# Patient Record
Sex: FEMALE | Race: WHITE | NOT HISPANIC OR LATINO | Employment: FULL TIME | URBAN - METROPOLITAN AREA
[De-identification: names, ages, dates, MRNs, and addresses within clinical notes are randomized per-mention and may not be internally consistent; named-entity substitution may affect disease eponyms.]

---

## 2017-01-25 ENCOUNTER — PATIENT OUTREACH (OUTPATIENT)
Dept: ADMINISTRATIVE | Facility: HOSPITAL | Age: 37
End: 2017-01-25

## 2017-02-08 ENCOUNTER — OFFICE VISIT (OUTPATIENT)
Dept: INTERNAL MEDICINE | Facility: CLINIC | Age: 37
End: 2017-02-08
Payer: COMMERCIAL

## 2017-02-08 VITALS
WEIGHT: 147.06 LBS | HEIGHT: 64 IN | BODY MASS INDEX: 25.11 KG/M2 | DIASTOLIC BLOOD PRESSURE: 82 MMHG | SYSTOLIC BLOOD PRESSURE: 116 MMHG | OXYGEN SATURATION: 97 % | HEART RATE: 87 BPM

## 2017-02-08 DIAGNOSIS — Z00.00 ANNUAL PHYSICAL EXAM: Primary | ICD-10-CM

## 2017-02-08 DIAGNOSIS — J30.1 SEASONAL ALLERGIC RHINITIS DUE TO POLLEN: ICD-10-CM

## 2017-02-08 PROCEDURE — 90472 IMMUNIZATION ADMIN EACH ADD: CPT | Mod: S$GLB,,, | Performed by: INTERNAL MEDICINE

## 2017-02-08 PROCEDURE — 90715 TDAP VACCINE 7 YRS/> IM: CPT | Mod: S$GLB,,, | Performed by: INTERNAL MEDICINE

## 2017-02-08 PROCEDURE — 90471 IMMUNIZATION ADMIN: CPT | Mod: S$GLB,,, | Performed by: INTERNAL MEDICINE

## 2017-02-08 PROCEDURE — 99385 PREV VISIT NEW AGE 18-39: CPT | Mod: 25,S$GLB,, | Performed by: INTERNAL MEDICINE

## 2017-02-08 PROCEDURE — 90686 IIV4 VACC NO PRSV 0.5 ML IM: CPT | Mod: S$GLB,,, | Performed by: INTERNAL MEDICINE

## 2017-02-08 PROCEDURE — 99999 PR PBB SHADOW E&M-EST. PATIENT-LVL III: CPT | Mod: PBBFAC,,, | Performed by: INTERNAL MEDICINE

## 2017-02-08 NOTE — MR AVS SNAPSHOT
"    [facility]  [address]  [place] [address]  Phone: [phone]  Fax: [phone]                  Akua Mejia   2017 1:40 PM   Office Visit    Description:  Female : 1980   Provider:  Donn Lamb MD   Department:  [facility]           Reason for Visit     Annual Exam           Diagnoses this Visit        Comments    Annual physical exam    -  Primary     Seasonal allergic rhinitis due to pollen                To Do List           Future Appointments        Provider Department Dept Phone    2017 7:30 AM LAB, [facility] [phone]      Goals (5 Years of Data)     None      Follow-Up and Disposition     Return if symptoms worsen or fail to improve.      [facility]     [facility] Care Line -  Assistance  Registered nurses in the [facility] provide clinical advisement, health education, appointment booking, and other advisory services.  Call for this free service at [phone].             Medications           Message regarding Medications     Verify the changes and/or additions to your medication regime listed below are the same as discussed with your clinician today.  If any of these changes or additions are incorrect, please notify your healthcare provider.        STOP taking these medications     montelukast (SINGULAIR) 10 mg tablet            Verify that the below list of medications is an accurate representation of the medications you are currently taking.  If none reported, the list may be blank. If incorrect, please contact your healthcare provider. Carry this list with you in case of emergency.           Current Medications     norethindrone-e.estradiol-iron (MINASTRIN 24 FE) 1 mg-20 mcg(24) /75 mg (4) Chew Take 1 tablet by mouth once daily.           Clinical Reference Information           Your Vitals Were     BP Pulse Height Weight SpO2 BMI    116/82 87 5' 4" (1.626 m) " 66.7 kg (147 lb 0.8 oz) 97% 25.24 kg/m2      Blood Pressure          Most Recent Value    BP  116/82      Allergies as of 2/8/2017     No Known Allergies      Immunizations Administered on Date of Encounter - 2/8/2017     Name Date Dose VIS Date Route    TDAP 2/8/2017 0.5 mL 2/24/2015 Intramuscular    influenza - Quadrivalent - PF (ADULT) 2/8/2017 0.5 mL 8/7/2015 Intramuscular      Orders Placed During Today's Visit      Normal Orders This Visit    Influenza - Quadrivalent (3 years & older) (PF)     Tdap Vaccine (Adult)     Future Labs/Procedures Expected by Expires    Comprehensive metabolic panel  2/8/2017 4/9/2018    Lipid panel  2/8/2017 3/26/2018    TSH  2/8/2017 4/9/2018      Language Assistance Services     ATTENTION: Language assistance services are available, free of charge. Please call [phone].      ATENCIÓN: Si habla español, tiene a white disposición servicios gratuitos de asistencia lingüística. Llame al [phone].     CHÚ Ý: N?u b?n nói Ti?ng Vi?t, có các d?ch v? h? tr? ngôn ng? mi?n phí dành cho b?n. G?i s? [phone].         [facility] complies with applicable Federal civil rights laws and does not discriminate on the basis of race, color, national origin, age, disability, or sex.

## 2017-02-08 NOTE — PROGRESS NOTES
Patient given Adacel 0.5ml IM in the LD. Patient tolerated well and Band-Aid was applied. Lot#[id number] Exp:12/10/18. Patient advised to wait in the lobby for 15 min to make sure no adverse reactions occur. Patient states verbal understanding and has no further questions.    Patient given Fluzone PF 0.5ml in the RD. Patient tolerated well and Band-Aid was applied. Lot#[id number] Exp:06/30/2017. Patient advised to wait in the lobby for 15 min to make sure no adverse reactions occur. Patient states verbal understanding and has no further questions.

## 2017-02-08 NOTE — PROGRESS NOTES
"Subjective:       Patient ID: Akua Mejia is a 36 y.o. female.    Chief Complaint: Annual Exam    HPI Comments:   New pt.      Here for physical exam.    She is feeling well today.  No c/o.     ROS  -Some pain in several MCP joints of B hands as well as B ankles, L>R, for the past 1-2 weeks.  No injury.  She isn' taking anything for these sx.  Sx are mild and are worse in the evenings.     Not exercising lately.         Review of Systems   Constitutional: Negative.    HENT: Negative.    Eyes: Negative.    Respiratory: Negative.    Cardiovascular: Negative.    Gastrointestinal: Negative.    Genitourinary: Negative.    Musculoskeletal: Negative.    Skin: Negative.    Neurological: Negative.    Psychiatric/Behavioral: Negative.        History reviewed. No pertinent past medical history.    History reviewed. No pertinent past surgical history.    Family History   Problem Relation Age of Onset    Colon cancer Paternal Grandfather     Thyroid disease Mother     Heart disease Father     Hypertension Father     Thyroid nodules Maternal Aunt     Thyroid disease Maternal Grandmother     Thyroid disease Paternal Grandmother     Breast cancer Neg Hx     Eclampsia Neg Hx      labor Neg Hx        Social History     Social History    Marital status: Single     Spouse name: N/A    Number of children: N/A    Years of education: N/A     Occupational History          Social History Main Topics    Smoking status: Never Smoker    Smokeless tobacco: None    Alcohol use No    Drug use: No    Sexual activity: Yes     Partners: Male     Birth control/ protection: OCP     Other Topics Concern    None     Social History Narrative    Lives w/partner.  Engaged to be  in .  No children.  Originally from [place].     Objective:       Vitals:    17 1317   BP: 116/82   Pulse: 87   SpO2: 97%   Weight: 66.7 kg (147 lb 0.8 oz)   Height: 5' 4" (1.626 m)     Physical Exam   Constitutional: She " is oriented to person, place, and time. She appears well-developed and well-nourished.   HENT:   Head: Normocephalic and atraumatic.   Right Ear: Tympanic membrane, external ear and ear canal normal.   Left Ear: Tympanic membrane, external ear and ear canal normal.   Mouth/Throat: Oropharynx is clear and moist. No oropharyngeal exudate or posterior oropharyngeal erythema.   Eyes: Conjunctivae and EOM are normal. Pupils are equal, round, and reactive to light.   Neck: Normal range of motion. Neck supple. No thyromegaly present.   Cardiovascular: Normal rate, regular rhythm and normal heart sounds.  Exam reveals no gallop and no friction rub.    No murmur heard.  Pulmonary/Chest: Effort normal and breath sounds normal. She has no wheezes. She has no rales.   Abdominal: Soft. Bowel sounds are normal. She exhibits no distension. There is no tenderness. There is no rebound and no guarding.   Musculoskeletal: Normal range of motion. She exhibits no edema.        Right wrist: Normal.        Left wrist: Normal.        Right hand: Normal.        Left hand: Normal.   Lymphadenopathy:     She has no cervical adenopathy.   Neurological: She is alert and oriented to person, place, and time. She has normal strength. She displays no atrophy and no tremor. No sensory deficit. She exhibits normal muscle tone. Gait normal.   Skin: Skin is warm and dry. No rash noted.   Psychiatric: She has a normal mood and affect. Her behavior is normal. Thought content normal.   Vitals reviewed.      Assessment:       1. Annual physical exam    2. Seasonal allergic rhinitis due to pollen        Plan:           HM - Update labs.  Tdap and flu vaccines.     AR - OTC meds PRN.

## 2017-02-13 ENCOUNTER — LAB VISIT (OUTPATIENT)
Dept: LAB | Facility: OTHER | Age: 37
End: 2017-02-13
Attending: INTERNAL MEDICINE
Payer: COMMERCIAL

## 2017-02-13 DIAGNOSIS — Z00.00 ANNUAL PHYSICAL EXAM: ICD-10-CM

## 2017-02-13 LAB
ALBUMIN SERPL BCP-MCNC: 3.5 G/DL
ALP SERPL-CCNC: 52 U/L
ALT SERPL W/O P-5'-P-CCNC: 13 U/L
ANION GAP SERPL CALC-SCNC: 8 MMOL/L
AST SERPL-CCNC: 18 U/L
BILIRUB SERPL-MCNC: 0.2 MG/DL
BUN SERPL-MCNC: 9 MG/DL
CALCIUM SERPL-MCNC: 9.3 MG/DL
CHLORIDE SERPL-SCNC: 106 MMOL/L
CHOLEST/HDLC SERPL: 3.3 {RATIO}
CO2 SERPL-SCNC: 24 MMOL/L
CREAT SERPL-MCNC: 0.8 MG/DL
EST. GFR  (AFRICAN AMERICAN): >60 ML/MIN/1.73 M^2
EST. GFR  (NON AFRICAN AMERICAN): >60 ML/MIN/1.73 M^2
GLUCOSE SERPL-MCNC: 90 MG/DL
HDL/CHOLESTEROL RATIO: 30 %
HDLC SERPL-MCNC: 180 MG/DL
HDLC SERPL-MCNC: 54 MG/DL
LDLC SERPL CALC-MCNC: 103.2 MG/DL
NONHDLC SERPL-MCNC: 126 MG/DL
POTASSIUM SERPL-SCNC: 4 MMOL/L
PROT SERPL-MCNC: 6.9 G/DL
SODIUM SERPL-SCNC: 138 MMOL/L
TRIGL SERPL-MCNC: 114 MG/DL
TSH SERPL DL<=0.005 MIU/L-ACNC: 2.25 UIU/ML

## 2017-02-13 PROCEDURE — 80053 COMPREHEN METABOLIC PANEL: CPT

## 2017-02-13 PROCEDURE — 36415 COLL VENOUS BLD VENIPUNCTURE: CPT

## 2017-02-13 PROCEDURE — 84443 ASSAY THYROID STIM HORMONE: CPT

## 2017-02-13 PROCEDURE — 80061 LIPID PANEL: CPT

## 2017-02-14 ENCOUNTER — PATIENT MESSAGE (OUTPATIENT)
Dept: INTERNAL MEDICINE | Facility: CLINIC | Age: 37
End: 2017-02-14

## 2017-03-21 ENCOUNTER — PATIENT MESSAGE (OUTPATIENT)
Dept: OBSTETRICS AND GYNECOLOGY | Facility: CLINIC | Age: 37
End: 2017-03-21

## 2017-03-21 DIAGNOSIS — Z30.41 ENCOUNTER FOR SURVEILLANCE OF CONTRACEPTIVE PILLS: ICD-10-CM

## 2017-03-21 RX ORDER — NORETHINDRONE ACETATE AND ETHINYL ESTRADIOL AND FERROUS FUMARATE 1MG-20(24)
1 KIT ORAL DAILY
Qty: 90 TABLET | Refills: 4 | Status: SHIPPED | OUTPATIENT
Start: 2017-03-21 | End: 2017-08-04 | Stop reason: SDUPTHER

## 2017-08-04 ENCOUNTER — PATIENT MESSAGE (OUTPATIENT)
Dept: INTERNAL MEDICINE | Facility: CLINIC | Age: 37
End: 2017-08-04

## 2017-08-04 ENCOUNTER — OFFICE VISIT (OUTPATIENT)
Dept: INTERNAL MEDICINE | Facility: CLINIC | Age: 37
End: 2017-08-04
Payer: COMMERCIAL

## 2017-08-04 ENCOUNTER — TELEPHONE (OUTPATIENT)
Dept: INTERNAL MEDICINE | Facility: CLINIC | Age: 37
End: 2017-08-04

## 2017-08-04 ENCOUNTER — PATIENT MESSAGE (OUTPATIENT)
Dept: OBSTETRICS AND GYNECOLOGY | Facility: CLINIC | Age: 37
End: 2017-08-04

## 2017-08-04 ENCOUNTER — TELEPHONE (OUTPATIENT)
Dept: OBSTETRICS AND GYNECOLOGY | Facility: CLINIC | Age: 37
End: 2017-08-04

## 2017-08-04 VITALS
DIASTOLIC BLOOD PRESSURE: 72 MMHG | TEMPERATURE: 99 F | OXYGEN SATURATION: 97 % | HEIGHT: 63 IN | WEIGHT: 153.88 LBS | SYSTOLIC BLOOD PRESSURE: 102 MMHG | BODY MASS INDEX: 27.27 KG/M2 | HEART RATE: 85 BPM

## 2017-08-04 DIAGNOSIS — J06.9 ACUTE URI: Primary | ICD-10-CM

## 2017-08-04 DIAGNOSIS — Z30.41 ENCOUNTER FOR SURVEILLANCE OF CONTRACEPTIVE PILLS: ICD-10-CM

## 2017-08-04 PROCEDURE — 99213 OFFICE O/P EST LOW 20 MIN: CPT | Mod: S$GLB,,, | Performed by: INTERNAL MEDICINE

## 2017-08-04 PROCEDURE — 99999 PR PBB SHADOW E&M-EST. PATIENT-LVL III: CPT | Mod: PBBFAC,,, | Performed by: INTERNAL MEDICINE

## 2017-08-04 PROCEDURE — 3008F BODY MASS INDEX DOCD: CPT | Mod: S$GLB,,, | Performed by: INTERNAL MEDICINE

## 2017-08-04 RX ORDER — AZITHROMYCIN 250 MG/1
TABLET, FILM COATED ORAL
COMMUNITY
Start: 2017-07-31 | End: 2017-09-06

## 2017-08-04 RX ORDER — NORETHINDRONE ACETATE AND ETHINYL ESTRADIOL AND FERROUS FUMARATE 1MG-20(24)
1 KIT ORAL DAILY
Qty: 90 TABLET | Refills: 1 | Status: SHIPPED | OUTPATIENT
Start: 2017-08-04 | End: 2017-09-06 | Stop reason: SDUPTHER

## 2017-08-04 RX ORDER — ALBUTEROL SULFATE 90 UG/1
2 AEROSOL, METERED RESPIRATORY (INHALATION) EVERY 6 HOURS PRN
Qty: 18 G | Refills: 0 | Status: SHIPPED | OUTPATIENT
Start: 2017-08-04 | End: 2018-08-22

## 2017-08-04 NOTE — TELEPHONE ENCOUNTER
----- Message from Shelley Taylor sent at 8/4/2017  8:20 AM CDT -----  Contact: SWAPNA ODEN [[id number]]  x_  1st Request  _  2nd Request  _  3rd Request        Who: SWAPNA ODEN [[id number]]    Why: patient would like a call back regarding medication. Please call     What Number to Call Back:[phone]    When to Expect a call back: (With in 24 hours)

## 2017-08-04 NOTE — TELEPHONE ENCOUNTER
----- Message from Danette Mejias LPN sent at 8/4/2017  9:12 AM CDT -----  Dr York,     Is it possible to get a renewal on my minastrin prescription until my appointment set in September?       Thank you,     Akua Mejia

## 2017-08-04 NOTE — TELEPHONE ENCOUNTER
Called the patient to verify and also forwarded her previous my chart message to Dr Lamb in regards to the rx that she changed her mind about.

## 2017-08-04 NOTE — PROGRESS NOTES
"Subjective:       Patient ID: Akua Mejia is a 37 y.o. female.    Chief Complaint: Shortness of Breath      Pt c/o URI sx which started 2 weeks ago.  1 week ago she went to  and was given a steroid shot and a Zpack.  She also was given promethazine but hasn't been taking this b/c it leaves her groggy.  Her sx have been improving this week but haven't fully resolved.     She isn't taking any OTCs except Tylenol.           Shortness of Breath   This is a new problem. The current episode started 1 to 4 weeks ago. The problem occurs daily. The problem has been gradually worsening. The average episode lasts 5 days. Associated symptoms include chest pain, coryza, ear pain, headaches, neck pain, orthopnea, rhinorrhea and a sore throat. The symptoms are aggravated by emotional upset, occupational exposure, exercise, odors, URIs and lying flat. Risk factors include oral contraceptive. She has tried body position changes and cool air for the symptoms. Her past medical history is significant for allergies. There is no history of aspirin allergies, asthma, CAD, PE or a recent surgery.     Review of Systems   HENT: Positive for ear pain, rhinorrhea and sore throat.    Respiratory: Positive for shortness of breath.    Cardiovascular: Positive for chest pain and orthopnea.   Musculoskeletal: Positive for neck pain.   Neurological: Positive for headaches.       Objective:       Vitals:    08/04/17 0748   BP: 102/72   Pulse: 85   Temp: 98.6 °F (37 °C)   SpO2: 97%   Weight: 69.8 kg (153 lb 14.1 oz)   Height: 5' 3" (1.6 m)     Physical Exam   Constitutional: She is oriented to person, place, and time. She appears well-developed and well-nourished. No distress.   HENT:   Head: Normocephalic and atraumatic.   Right Ear: Tympanic membrane, external ear and ear canal normal.   Left Ear: Tympanic membrane, external ear and ear canal normal.   Mouth/Throat: Uvula is midline, oropharynx is clear and moist and mucous membranes are normal. " No oropharyngeal exudate or posterior oropharyngeal erythema.   Eyes: Conjunctivae and EOM are normal. Pupils are equal, round, and reactive to light.   Neck: Normal range of motion. Neck supple.   Cardiovascular: Normal rate, regular rhythm and normal heart sounds.  Exam reveals no gallop and no friction rub.    No murmur heard.  Pulmonary/Chest: Effort normal and breath sounds normal. No respiratory distress. She has no wheezes. She has no rhonchi. She has no rales.   Lymphadenopathy:     She has no cervical adenopathy.   Neurological: She is alert and oriented to person, place, and time.   Skin: She is not diaphoretic.       Assessment:       1. Acute URI        Plan:           URI - Resolving.  Cont current tx.  Pt reassured.

## 2017-09-06 ENCOUNTER — OFFICE VISIT (OUTPATIENT)
Dept: OBSTETRICS AND GYNECOLOGY | Facility: CLINIC | Age: 37
End: 2017-09-06
Payer: COMMERCIAL

## 2017-09-06 VITALS
SYSTOLIC BLOOD PRESSURE: 98 MMHG | DIASTOLIC BLOOD PRESSURE: 70 MMHG | BODY MASS INDEX: 26.91 KG/M2 | WEIGHT: 151.88 LBS | HEIGHT: 63 IN

## 2017-09-06 DIAGNOSIS — Z30.41 ENCOUNTER FOR SURVEILLANCE OF CONTRACEPTIVE PILLS: ICD-10-CM

## 2017-09-06 DIAGNOSIS — Z01.419 WELL WOMAN EXAM WITH ROUTINE GYNECOLOGICAL EXAM: Primary | ICD-10-CM

## 2017-09-06 PROCEDURE — 99395 PREV VISIT EST AGE 18-39: CPT | Mod: S$GLB,,, | Performed by: OBSTETRICS & GYNECOLOGY

## 2017-09-06 PROCEDURE — 99999 PR PBB SHADOW E&M-EST. PATIENT-LVL III: CPT | Mod: PBBFAC,,, | Performed by: OBSTETRICS & GYNECOLOGY

## 2017-09-06 RX ORDER — NORETHINDRONE ACETATE AND ETHINYL ESTRADIOL AND FERROUS FUMARATE 1MG-20(24)
1 KIT ORAL DAILY
Qty: 90 TABLET | Refills: 5 | Status: SHIPPED | OUTPATIENT
Start: 2017-09-06 | End: 2017-11-28

## 2017-09-06 NOTE — PROGRESS NOTES
SUBJECTIVE:   37 y.o. female   for annual routine Pap and checkup. Patient's last menstrual period was 2017..  She has no unusual complaints.  Pt is concerned about weight gain over the past year.  Has gained 9# in 1 year.  Discussed eating habits and exercise regimen.  Discussed exercising for at least 30 min a day and decreasing stressors in life.  Normal menses.  On OCPs.  Doing well.         History reviewed. No pertinent past medical history.  History reviewed. No pertinent surgical history.  Social History     Social History    Marital status: Single     Spouse name: N/A    Number of children: N/A    Years of education: N/A     Occupational History          Social History Main Topics    Smoking status: Never Smoker    Smokeless tobacco: Never Used    Alcohol use No    Drug use: No    Sexual activity: Yes     Partners: Male     Birth control/ protection: OCP     Other Topics Concern    Not on file     Social History Narrative    Lives w/partner.  Engaged to be  in .  No children.  Originally from [place].     Family History   Problem Relation Age of Onset    Colon cancer Paternal Grandfather     Thyroid disease Mother     Heart disease Father     Hypertension Father     Thyroid nodules Maternal Aunt     Thyroid disease Maternal Grandmother     Thyroid disease Paternal Grandmother     Breast cancer Neg Hx     Eclampsia Neg Hx      labor Neg Hx      OB History    Para Term  AB Living   0 0 0 0 0 0   SAB TAB Ectopic Multiple Live Births   0 0 0 0                 Current Outpatient Prescriptions   Medication Sig Dispense Refill    MINASTRIN 24 FE 1 mg-20 mcg(24) /75 mg (4) Chew Take 1 tablet by mouth once daily. 90 tablet 5    albuterol 90 mcg/actuation inhaler Inhale 2 puffs into the lungs every 6 (six) hours as needed for Wheezing or Shortness of Breath. Rescue 18 g 0     No current facility-administered medications for this visit.   "    Allergies: Review of patient's allergies indicates no known allergies.     ROS:  Constitutional: no weight loss, weight gain, fever, fatigue  Eyes:  No vision changes, glasses/contacts  ENT/Mouth: No ulcers, sinus problems, ears ringing, headache  Cardiovascular: No inability to lie flat, chest pain, exercise intolerance, swelling, heart palpitations  Respiratory: No wheezing, coughing blood, shortness of breath, or cough  Gastrointestinal: No diarrhea, bloody stool, nausea/vomiting, constipation, gas, hemorrhoids  Genitourinary: No blood in urine, painful urination, urgency of urination, frequency of urination, incomplete emptying, incontinence, abnormal bleeding, painful periods, heavy periods, vaginal discharge, vaginal odor, painful intercourse, sexual problems, bleeding after intercourse.  Musculoskeletal: No muscle weakness  Skin/Breast: No painful breasts, nipple discharge, masses, rash, ulcers  Neurological: No passing out, seizures, numbness, headache  Endocrine: No diabetes, hypothyroid, hyperthyroid, hot flashes, hair loss, abnormal hair growth, ance  Psychiatric: No depression, crying  Hematologic: No bruises, bleeding, swollen lymph nodes, anemia.      OBJECTIVE:   The patient appears well, alert, oriented x 3, in no distress.  BP 98/70   Ht 5' 3" (1.6 m)   Wt 68.9 kg (151 lb 14.4 oz)   LMP 08/26/2017   BMI 26.91 kg/m²   NECK: no thyromegaly, trachea midline  SKIN: no acne, striae, hirsutism  BREAST EXAM: breasts appear normal, no suspicious masses, no skin or nipple changes or axillary nodes  ABDOMEN: no hernias, masses, or hepatosplenomegaly  GENITALIA: normal external genitalia, no erythema, no discharge  URETHRA: normal urethra, normal urethral meatus  VAGINA: Normal  CERVIX: no lesions or cervical motion tenderness  UTERUS: normal size, contour, position, consistency, mobility, non-tender  ADNEXA: normal adnexa      ASSESSMENT:   Ollie was seen today for annual exam.    Diagnoses and all " orders for this visit:    Well woman exam with routine gynecological exam  - pap in 2016 normal.  No pap indicated at this time  - All other health maintenance up to date  - Discussed weight loss and weight maintenance (see HPI)    Encounter for surveillance of contraceptive pills  - Refilled OCPs  -     MINASTRIN 24 FE 1 mg-20 mcg(24) /75 mg (4) Chew; Take 1 tablet by mouth once daily.      Lizbeth Ma MD  PGY-4 OB/GYN  [phone]

## 2017-11-03 ENCOUNTER — TELEPHONE (OUTPATIENT)
Dept: OTOLARYNGOLOGY | Facility: CLINIC | Age: 37
End: 2017-11-03

## 2017-11-28 ENCOUNTER — PATIENT MESSAGE (OUTPATIENT)
Dept: OBSTETRICS AND GYNECOLOGY | Facility: CLINIC | Age: 37
End: 2017-11-28

## 2017-11-28 RX ORDER — NORETHINDRONE ACETATE AND ETHINYL ESTRADIOL .02; 1 MG/1; MG/1
1 TABLET ORAL DAILY
Qty: 28 TABLET | Refills: 12 | Status: SHIPPED | OUTPATIENT
Start: 2017-11-28 | End: 2018-08-22 | Stop reason: SDUPTHER

## 2018-08-22 ENCOUNTER — OFFICE VISIT (OUTPATIENT)
Dept: OBSTETRICS AND GYNECOLOGY | Facility: CLINIC | Age: 38
End: 2018-08-22
Payer: COMMERCIAL

## 2018-08-22 VITALS
SYSTOLIC BLOOD PRESSURE: 114 MMHG | HEIGHT: 63 IN | WEIGHT: 146.19 LBS | DIASTOLIC BLOOD PRESSURE: 76 MMHG | BODY MASS INDEX: 25.9 KG/M2

## 2018-08-22 DIAGNOSIS — Z01.419 WELL WOMAN EXAM WITH ROUTINE GYNECOLOGICAL EXAM: Primary | ICD-10-CM

## 2018-08-22 PROBLEM — J30.1 SEASONAL ALLERGIC RHINITIS DUE TO POLLEN: Status: RESOLVED | Noted: 2017-02-08 | Resolved: 2018-08-22

## 2018-08-22 PROCEDURE — 99999 PR PBB SHADOW E&M-EST. PATIENT-LVL III: CPT | Mod: PBBFAC,,, | Performed by: OBSTETRICS & GYNECOLOGY

## 2018-08-22 PROCEDURE — 83520 IMMUNOASSAY QUANT NOS NONAB: CPT

## 2018-08-22 PROCEDURE — 99395 PREV VISIT EST AGE 18-39: CPT | Mod: S$GLB,,, | Performed by: OBSTETRICS & GYNECOLOGY

## 2018-08-22 RX ORDER — NORETHINDRONE ACETATE AND ETHINYL ESTRADIOL .02; 1 MG/1; MG/1
1 TABLET ORAL DAILY
Qty: 28 TABLET | Refills: 12 | Status: SHIPPED | OUTPATIENT
Start: 2018-08-22 | End: 2019-08-09 | Stop reason: SDUPTHER

## 2018-08-22 NOTE — PROGRESS NOTES
SUBJECTIVE:   38 y.o. female   for annual routine Pap and checkup. Patient's last menstrual period was 2018..  She has no unusual complaints and desires pregnancy within the next year.        History reviewed. No pertinent past medical history.  History reviewed. No pertinent surgical history.  Social History     Socioeconomic History    Marital status:      Spouse name: Not on file    Number of children: Not on file    Years of education: Not on file    Highest education level: Not on file   Social Needs    Financial resource strain: Not on file    Food insecurity - worry: Not on file    Food insecurity - inability: Not on file    Transportation needs - medical: Not on file    Transportation needs - non-medical: Not on file   Occupational History    Occupation:    Tobacco Use    Smoking status: Never Smoker    Smokeless tobacco: Never Used   Substance and Sexual Activity    Alcohol use: No    Drug use: No    Sexual activity: Yes     Partners: Male     Birth control/protection: OCP   Other Topics Concern    Not on file   Social History Narrative    Lives w/partner.  Engaged to be  in .  No children.  Originally from [place].     Family History   Problem Relation Age of Onset    Colon cancer Paternal Grandfather     Thyroid disease Mother     Heart disease Father     Hypertension Father     Thyroid nodules Maternal Aunt     Thyroid disease Maternal Grandmother     Thyroid disease Paternal Grandmother     Breast cancer Neg Hx     Eclampsia Neg Hx      labor Neg Hx      OB History    Para Term  AB Living   0 0 0 0 0 0   SAB TAB Ectopic Multiple Live Births   0 0 0 0                 Current Outpatient Medications   Medication Sig Dispense Refill    norethindrone-ethinyl estradiol (LOESTRIN , ,) 1-20 mg-mcg per tablet Take 1 tablet by mouth once daily. 28 tablet 12     No current facility-administered medications for this  "visit.      Allergies: Patient has no known allergies.     ROS:  Constitutional: no weight loss, weight gain, fever, fatigue  Eyes:  No vision changes, glasses/contacts  ENT/Mouth: No ulcers, sinus problems, ears ringing, headache  Cardiovascular: No inability to lie flat, chest pain, exercise intolerance, swelling, heart palpitations  Respiratory: No wheezing, coughing blood, shortness of breath, or cough  Gastrointestinal: No diarrhea, bloody stool, nausea/vomiting, constipation, gas, hemorrhoids  Genitourinary: No blood in urine, painful urination, urgency of urination, frequency of urination, incomplete emptying, incontinence, abnormal bleeding, painful periods, heavy periods, vaginal discharge, vaginal odor, painful intercourse, sexual problems, bleeding after intercourse.  Musculoskeletal: No muscle weakness  Skin/Breast: No painful breasts, nipple discharge, masses, rash, ulcers  Neurological: No passing out, seizures, numbness, headache  Endocrine: No diabetes, hypothyroid, hyperthyroid, hot flashes, hair loss, abnormal hair growth, ance  Psychiatric: No depression, crying  Hematologic: No bruises, bleeding, swollen lymph nodes, anemia.      OBJECTIVE:   The patient appears well, alert, oriented x 3, in no distress.  /76   Ht 5' 3" (1.6 m)   Wt 66.3 kg (146 lb 2.6 oz)   LMP 08/01/2018   BMI 25.89 kg/m²   NECK: no thyromegaly, trachea midline  SKIN: no acne, striae, hirsutism  BREAST EXAM: breasts appear normal, no suspicious masses, no skin or nipple changes or axillary nodes  ABDOMEN: no hernias, masses, or hepatosplenomegaly  GENITALIA: normal external genitalia, no erythema, no discharge  URETHRA: normal urethra, normal urethral meatus  VAGINA: Normal  CERVIX: no lesions or cervical motion tenderness  UTERUS: normal size, contour, position, consistency, mobility, non-tender  ADNEXA: no mass, fullness, tenderness    \  ASSESSMENT:   Ollie was seen today for annual exam.    Diagnoses and all " orders for this visit:    Well woman exam with routine gynecological exam  -     Cancel: Antimullerian hormone ([facility]); Future  -     Antimullerian hormone ([facility])    Other orders  -     norethindrone-ethinyl estradiol (LOESTRIN 1/20, 21,) 1-20 mg-mcg per tablet; Take 1 tablet by mouth once daily.

## 2018-08-27 LAB — MIS SERPL-MCNC: 0.9 NG/ML (ref 0.9–9.5)

## 2019-01-09 ENCOUNTER — OFFICE VISIT (OUTPATIENT)
Dept: URGENT CARE | Facility: CLINIC | Age: 39
End: 2019-01-09
Payer: COMMERCIAL

## 2019-01-09 VITALS
HEIGHT: 63 IN | BODY MASS INDEX: 24.1 KG/M2 | WEIGHT: 136 LBS | HEART RATE: 63 BPM | DIASTOLIC BLOOD PRESSURE: 78 MMHG | TEMPERATURE: 99 F | SYSTOLIC BLOOD PRESSURE: 111 MMHG | RESPIRATION RATE: 18 BRPM | OXYGEN SATURATION: 99 %

## 2019-01-09 DIAGNOSIS — H18.822 CORNEAL ABRASION OF LEFT EYE DUE TO CONTACT LENS: Primary | ICD-10-CM

## 2019-01-09 PROCEDURE — 3008F PR BODY MASS INDEX (BMI) DOCUMENTED: ICD-10-PCS | Mod: CPTII,S$GLB,, | Performed by: NURSE PRACTITIONER

## 2019-01-09 PROCEDURE — 99214 OFFICE O/P EST MOD 30 MIN: CPT | Mod: S$GLB,,, | Performed by: NURSE PRACTITIONER

## 2019-01-09 PROCEDURE — 3008F BODY MASS INDEX DOCD: CPT | Mod: CPTII,S$GLB,, | Performed by: NURSE PRACTITIONER

## 2019-01-09 PROCEDURE — 99214 PR OFFICE/OUTPT VISIT, EST, LEVL IV, 30-39 MIN: ICD-10-PCS | Mod: S$GLB,,, | Performed by: NURSE PRACTITIONER

## 2019-01-09 RX ORDER — TRIAMTERENE AND HYDROCHLOROTHIAZIDE 37.5; 25 MG/1; MG/1
CAPSULE ORAL
Refills: 3 | COMMUNITY
Start: 2018-12-18 | End: 2020-09-30

## 2019-01-09 RX ORDER — OFLOXACIN 3 MG/ML
1 SOLUTION/ DROPS OPHTHALMIC
Qty: 10 ML | Refills: 0 | Status: SHIPPED | OUTPATIENT
Start: 2019-01-09 | End: 2019-01-16

## 2019-01-09 NOTE — PATIENT INSTRUCTIONS
Follow up with your eye doctor as discussed in clinic today.     You must understand that you've received an Urgent Care treatment only and that you may be released before all your medical problems are known or treated. You, the patient, will arrange for follow up care as instructed.  If your condition worsens we recommend that you receive another evaluation at the emergency room immediately or contact your primary medical clinics after hours call service to discuss your concerns.  Please return here or go to the Emergency Department for any concerns or worsening of condition.      Corneal Abrasion (Child)  The cornea is the clear part in front of the eye. If the cornea becomes scratched, the injury is called a corneal abrasion. Corneal abrasions cause severe eye pain, inability to open the eye, blurred vision, watery eyes, and sensitivity to light. The eye may become red and swollen.  A corneal abrasion may be caused by a foreign object in the eye (such as dirt or sand), a fingernail or other object that pokes the eye, or anything else that can scratch the eye. The injured eye is treated with numbing drops, then examined and rinsed. Eye drops and ointment may be used for pain or to prevent infection. Pain medicine may also be used. A superficial corneal injury in a young child usually heals overnight. The eye is considered healed if the child is happy to keep it open. Deeper corneal injuries may take longer to heal.  Home care  · Medicines: Your healthcare provider may prescribe eye drops or an ointment to help the injury heal and to prevent infection. The healthcare provider may also prescribe pain medicine. Follow the healthcare providers instructions when using these medicines. Eye ointment may cause blurry vision. Apply ointment right before your child goes to sleep.  · If both drops and ointment are prescribed, give the drops first. Wait 3 minutes, then apply the ointment. Doing this will give each drug time  to work.  · Place eye drops, if they were prescribed, in the corner of the eye where the eyelid meets the nose. The medicine will pool in this area. When your child opens the lid, the medicine will flow into the eye.  · Apply ointment, if it was prescribed, by gently pulling down the lower lid. Place the prescribed amount of ointment on the inside of the lid. After closing the lid, wipe away excess medicine from the nose area outward to keep the eyes as clean as possible.  General care  · Shield your childs eyes when in direct sunlight to avoid irritation.  · Try to prevent your child from rubbing the eye. Rubbing slows healing.  · Prevent future injury to the eyes: Keep your fingernails and your childs nails short; keep all pointed objects away from your child.  · Monitor the eye for signs of infection (see below).  Follow-up care  Follow up with your childs healthcare provider, or as advised. Corneal abrasions may be referred to a pediatric eye specialist (ophthalmologist).  Special note to parents  Eye medicines may make your childs vision blurry for a while. Any discomfort can be reduced by giving medicine before bedtime.  When to seek medical advice  Call your childs healthcare provider right away if any of the following occur:  · If your usually healthy child has a fever as described below, call the healthcare provider right away:  ¨ Your child is of any age and has repeated fevers above 104°F (40°C).  ¨ Your child is younger than 2 years of age and a fever of 100.4°F (38°C) continues for more than 1 day.  ¨ Your child is 2 years old or older and a fever of 100.4°F (38°C) continues for more than 3 days.  · Signs of infection, such as increased redness and swelling, or foul-smelling drainage from the eye  · Continuing or increasing pain  · Unwillingness to keep eyes open  Date Last Reviewed: 6/14/2015  © 3685-1968 [employer]. [address] [place] [address], [place] [address]. All rights reserved.  This information is not intended as a substitute for professional medical care. Always follow your healthcare professional's instructions.

## 2019-01-09 NOTE — PROGRESS NOTES
"Subjective:       Patient ID: Akua YOUSSEF is a 38 y.o. female.    Vitals:  height is 5' 3" (1.6 m) and weight is 61.7 kg (136 lb). Her oral temperature is 98.6 °F (37 °C). Her blood pressure is 111/78 and her pulse is 63. Her respiration is 18 and oxygen saturation is 99%.     Chief Complaint: Eye Problem    Patient wears contacts that she takes out daily, she states her pain is gradually worsening. She states two weeks ago someone in her office did have pink eye. Denies any drainage. She does have an estbalished optometrist.       Eye Problem    The left eye is affected. This is a new problem. The current episode started yesterday. The problem occurs constantly. The problem has been gradually worsening. There was no injury mechanism. The pain is at a severity of 4/10. The pain is mild. There is known exposure to pink eye. She wears contacts. Associated symptoms include eye redness and a foreign body sensation. Pertinent negatives include no blurred vision, eye discharge, double vision, fever, itching, nausea, photophobia, recent URI or vomiting. She has tried eye drops for the symptoms. The treatment provided no relief.       Constitution: Negative for chills and fever.   HENT: Negative for congestion and sinus pain.    Eyes: Positive for eye pain and eye redness. Negative for eye trauma, foreign body in eye, eye discharge, eye itching, photophobia, vision loss, double vision, blurred vision and eyelid swelling.   Gastrointestinal: Negative for nausea and vomiting.   Genitourinary: Negative for history of kidney stones.   Skin: Negative for rash.   Allergic/Immunologic: Negative for seasonal allergies and itching.   Neurological: Negative for headaches.       Objective:      Physical Exam   Constitutional: She is oriented to person, place, and time. She appears well-developed and well-nourished.   HENT:   Head: Normocephalic and atraumatic.   Right Ear: External ear normal.   Left Ear: External ear normal. "   Nose: Nose normal.   Mouth/Throat: Oropharynx is clear and moist.   Eyes: Conjunctivae, EOM and lids are normal. Pupils are equal, round, and reactive to light. Lids are everted and swept, no foreign bodies found.   Slit lamp exam:       The left eye shows corneal ulcer and fluorescein uptake. The left eye shows no corneal flare and no foreign body.       Neck: Trachea normal, full passive range of motion without pain and phonation normal. Neck supple.   Musculoskeletal: Normal range of motion.   Neurological: She is alert and oriented to person, place, and time.   Skin: Skin is warm, dry and intact.   Psychiatric: She has a normal mood and affect. Her speech is normal and behavior is normal. Judgment and thought content normal. Cognition and memory are normal.   Nursing note and vitals reviewed.      Assessment:       1. Corneal abrasion of left eye due to contact lens        Plan:         Corneal abrasion of left eye due to contact lens  -     ofloxacin (OCUFLOX) 0.3 % ophthalmic solution; Place 1 drop into the left eye every 2 (two) hours. for 7 days  Dispense: 10 mL; Refill: 0            Patient Instructions   Follow up with your eye doctor as discussed in clinic today.     You must understand that you've received an Urgent Care treatment only and that you may be released before all your medical problems are known or treated. You, the patient, will arrange for follow up care as instructed.  If your condition worsens we recommend that you receive another evaluation at the emergency room immediately or contact your primary medical clinics after hours call service to discuss your concerns.  Please return here or go to the Emergency Department for any concerns or worsening of condition.      Corneal Abrasion (Child)  The cornea is the clear part in front of the eye. If the cornea becomes scratched, the injury is called a corneal abrasion. Corneal abrasions cause severe eye pain, inability to open the eye, blurred  vision, watery eyes, and sensitivity to light. The eye may become red and swollen.  A corneal abrasion may be caused by a foreign object in the eye (such as dirt or sand), a fingernail or other object that pokes the eye, or anything else that can scratch the eye. The injured eye is treated with numbing drops, then examined and rinsed. Eye drops and ointment may be used for pain or to prevent infection. Pain medicine may also be used. A superficial corneal injury in a young child usually heals overnight. The eye is considered healed if the child is happy to keep it open. Deeper corneal injuries may take longer to heal.  Home care  · Medicines: Your healthcare provider may prescribe eye drops or an ointment to help the injury heal and to prevent infection. The healthcare provider may also prescribe pain medicine. Follow the healthcare providers instructions when using these medicines. Eye ointment may cause blurry vision. Apply ointment right before your child goes to sleep.  · If both drops and ointment are prescribed, give the drops first. Wait 3 minutes, then apply the ointment. Doing this will give each drug time to work.  · Place eye drops, if they were prescribed, in the corner of the eye where the eyelid meets the nose. The medicine will pool in this area. When your child opens the lid, the medicine will flow into the eye.  · Apply ointment, if it was prescribed, by gently pulling down the lower lid. Place the prescribed amount of ointment on the inside of the lid. After closing the lid, wipe away excess medicine from the nose area outward to keep the eyes as clean as possible.  General care  · Shield your childs eyes when in direct sunlight to avoid irritation.  · Try to prevent your child from rubbing the eye. Rubbing slows healing.  · Prevent future injury to the eyes: Keep your fingernails and your childs nails short; keep all pointed objects away from your child.  · Monitor the eye for signs of  infection (see below).  Follow-up care  Follow up with your childs healthcare provider, or as advised. Corneal abrasions may be referred to a pediatric eye specialist (ophthalmologist).  Special note to parents  Eye medicines may make your childs vision blurry for a while. Any discomfort can be reduced by giving medicine before bedtime.  When to seek medical advice  Call your childs healthcare provider right away if any of the following occur:  · If your usually healthy child has a fever as described below, call the healthcare provider right away:  ¨ Your child is of any age and has repeated fevers above 104°F (40°C).  ¨ Your child is younger than 2 years of age and a fever of 100.4°F (38°C) continues for more than 1 day.  ¨ Your child is 2 years old or older and a fever of 100.4°F (38°C) continues for more than 3 days.  · Signs of infection, such as increased redness and swelling, or foul-smelling drainage from the eye  · Continuing or increasing pain  · Unwillingness to keep eyes open  Date Last Reviewed: 6/14/2015  © 9895-5670 "[employer]. [address] [place] [address]. All rights reserved. This information is not intended as a substitute for professional medical care. Always follow your healthcare professional's instructions.

## 2019-08-09 RX ORDER — NORETHINDRONE ACETATE AND ETHINYL ESTRADIOL .02; 1 MG/1; MG/1
TABLET ORAL
Qty: 21 TABLET | Refills: 1 | Status: SHIPPED | OUTPATIENT
Start: 2019-08-09 | End: 2019-09-04 | Stop reason: SDUPTHER

## 2019-09-04 ENCOUNTER — OFFICE VISIT (OUTPATIENT)
Dept: OBSTETRICS AND GYNECOLOGY | Facility: CLINIC | Age: 39
End: 2019-09-04
Payer: COMMERCIAL

## 2019-09-04 ENCOUNTER — APPOINTMENT (OUTPATIENT)
Dept: LAB | Facility: HOSPITAL | Age: 39
End: 2019-09-04
Attending: OBSTETRICS & GYNECOLOGY
Payer: COMMERCIAL

## 2019-09-04 VITALS
BODY MASS INDEX: 26.13 KG/M2 | DIASTOLIC BLOOD PRESSURE: 70 MMHG | HEIGHT: 63 IN | WEIGHT: 147.5 LBS | SYSTOLIC BLOOD PRESSURE: 118 MMHG

## 2019-09-04 DIAGNOSIS — N92.1 BREAKTHROUGH BLEEDING ON BIRTH CONTROL PILLS: ICD-10-CM

## 2019-09-04 DIAGNOSIS — Z01.419 WELL WOMAN EXAM WITH ROUTINE GYNECOLOGICAL EXAM: Primary | ICD-10-CM

## 2019-09-04 DIAGNOSIS — Z30.41 ENCOUNTER FOR SURVEILLANCE OF CONTRACEPTIVE PILLS: ICD-10-CM

## 2019-09-04 PROCEDURE — 88141 LIQUID-BASED PAP SMEAR, SCREENING: ICD-10-PCS | Mod: ,,, | Performed by: PATHOLOGY

## 2019-09-04 PROCEDURE — 99395 PR PREVENTIVE VISIT,EST,18-39: ICD-10-PCS | Mod: S$GLB,,, | Performed by: OBSTETRICS & GYNECOLOGY

## 2019-09-04 PROCEDURE — 99999 PR PBB SHADOW E&M-EST. PATIENT-LVL III: ICD-10-PCS | Mod: PBBFAC,,, | Performed by: OBSTETRICS & GYNECOLOGY

## 2019-09-04 PROCEDURE — 87624 HPV HI-RISK TYP POOLED RSLT: CPT

## 2019-09-04 PROCEDURE — 88141 CYTOPATH C/V INTERPRET: CPT | Mod: ,,, | Performed by: PATHOLOGY

## 2019-09-04 PROCEDURE — 84443 ASSAY THYROID STIM HORMONE: CPT

## 2019-09-04 PROCEDURE — 88142 CYTOPATH C/V THIN LAYER: CPT | Performed by: PATHOLOGY

## 2019-09-04 PROCEDURE — 99999 PR PBB SHADOW E&M-EST. PATIENT-LVL III: CPT | Mod: PBBFAC,,, | Performed by: OBSTETRICS & GYNECOLOGY

## 2019-09-04 PROCEDURE — 99395 PREV VISIT EST AGE 18-39: CPT | Mod: S$GLB,,, | Performed by: OBSTETRICS & GYNECOLOGY

## 2019-09-04 RX ORDER — NORETHINDRONE ACETATE AND ETHINYL ESTRADIOL .02; 1 MG/1; MG/1
1 TABLET ORAL DAILY
Qty: 21 TABLET | Refills: 12 | Status: SHIPPED | OUTPATIENT
Start: 2019-09-04 | End: 2020-09-30

## 2019-09-04 NOTE — PROGRESS NOTES
SUBJECTIVE:   39 y.o. female   for annual routine Pap and checkup. Patient's last menstrual period was 2019 (exact date)..  She complains of 8 days of breakthrough bleeding on ocps.  She denies missing any pills.  She describes this bleeding as heavy.  It has now resolved.  She is on bp meds for her ear, not for htn.  She is trying to lose weight.  They are not currently trying for pregnancy.  Recommend 1g folic acid when prior to stopping ocps and to stop bp meds when pregnant..        History reviewed. No pertinent past medical history.  History reviewed. No pertinent surgical history.  Social History     Socioeconomic History    Marital status:      Spouse name: Not on file    Number of children: Not on file    Years of education: Not on file    Highest education level: Not on file   Occupational History    Occupation:    Social Needs    Financial resource strain: Not on file    Food insecurity:     Worry: Not on file     Inability: Not on file    Transportation needs:     Medical: Not on file     Non-medical: Not on file   Tobacco Use    Smoking status: Never Smoker    Smokeless tobacco: Never Used   Substance and Sexual Activity    Alcohol use: No    Drug use: No    Sexual activity: Yes     Partners: Male     Birth control/protection: OCP   Lifestyle    Physical activity:     Days per week: Not on file     Minutes per session: Not on file    Stress: Not on file   Relationships    Social connections:     Talks on phone: Not on file     Gets together: Not on file     Attends [employer] service: Not on file     Active member of club or organization: Not on file     Attends meetings of clubs or organizations: Not on file     Relationship status: Not on file   Other Topics Concern    Not on file   Social History Narrative    Lives w/partner.  Engaged to be  in .  No children.  Originally from [place].     Family History   Problem Relation Age of Onset     Colon cancer Paternal Grandfather     Thyroid disease Mother     Heart disease Father     Hypertension Father     Thyroid nodules Maternal Aunt     Thyroid disease Maternal Grandmother     Thyroid disease Paternal Grandmother     Breast cancer Neg Hx     Eclampsia Neg Hx      labor Neg Hx      OB History    Para Term  AB Living   0 0 0 0 0 0   SAB TAB Ectopic Multiple Live Births   0 0 0 0           Current Outpatient Medications   Medication Sig Dispense Refill    norethindrone-ethinyl estradiol (MICROGESTIN ) 1-20 mg-mcg per tablet Take 1 tablet by mouth once daily. 21 tablet 12    triamterene-hydrochlorothiazide 37.5-25 mg (DYAZIDE) 37.5-25 mg per capsule TAKE BY MOUTH 1 CAPSULE EVERYDAY IN THE MORNING  3     No current facility-administered medications for this visit.      Allergies: Patient has no known allergies.     ROS:  Constitutional: no weight loss, weight gain, fever, fatigue  Eyes:  No vision changes, glasses/contacts  ENT/Mouth: No ulcers, sinus problems, ears ringing, headache  Cardiovascular: No inability to lie flat, chest pain, exercise intolerance, swelling, heart palpitations  Respiratory: No wheezing, coughing blood, shortness of breath, or cough  Gastrointestinal: No diarrhea, bloody stool, nausea/vomiting, constipation, gas, hemorrhoids  Genitourinary: No blood in urine, painful urination, urgency of urination, frequency of urination, incomplete emptying, incontinence, abnormal bleeding, painful periods, heavy periods, vaginal discharge, vaginal odor, painful intercourse, sexual problems, bleeding after intercourse.  Musculoskeletal: No muscle weakness  Skin/Breast: No painful breasts, nipple discharge, masses, rash, ulcers  Neurological: No passing out, seizures, numbness, headache  Endocrine: No diabetes, hypothyroid, hyperthyroid, hot flashes, hair loss, abnormal hair growth, ance  Psychiatric: No depression, crying  Hematologic: No bruises, bleeding,  "swollen lymph nodes, anemia.      OBJECTIVE:   The patient appears well, alert, oriented x 3, in no distress.  /70   Ht 5' 3" (1.6 m)   Wt 66.9 kg (147 lb 7.8 oz)   LMP 08/13/2019 (Exact Date)   BMI 26.13 kg/m²   NECK: no thyromegaly, trachea midline  SKIN: no acne, striae, hirsutism  BREAST EXAM: breasts appear normal, no suspicious masses, no skin or nipple changes or axillary nodes  ABDOMEN: no hernias, masses, or hepatosplenomegaly  GENITALIA: normal external genitalia, no erythema, no discharge  URETHRA: normal urethra, normal urethral meatus  VAGINA: Normal  CERVIX: no lesions or cervical motion tenderness  UTERUS: mobile, tender and well supported  ADNEXA: no mass, fullness, tenderness    \  ASSESSMENT:   Ollie was seen today for annual exam, medication refill, menstrual problem and leg pain.    Diagnoses and all orders for this visit:    Well woman exam with routine gynecological exam  -     Liquid-based pap smear, screening  -     HPV High Risk Genotypes, PCR    Encounter for surveillance of contraceptive pills  -     norethindrone-ethinyl estradiol (MICROGESTIN 1/20) 1-20 mg-mcg per tablet; Take 1 tablet by mouth once daily.    Breakthrough bleeding on birth control pills  -     US Pelvis Comp with Transvag NON-OB (xpd); Future  -     TSH    follow up labs and ultrasound.    "

## 2019-09-05 LAB — TSH SERPL DL<=0.005 MIU/L-ACNC: 1.91 UIU/ML (ref 0.4–4)

## 2019-09-09 LAB
HPV HR 12 DNA CVX QL NAA+PROBE: NEGATIVE
HPV16 AG SPEC QL: NEGATIVE
HPV18 DNA SPEC QL NAA+PROBE: NEGATIVE

## 2019-09-17 ENCOUNTER — PATIENT MESSAGE (OUTPATIENT)
Dept: OBSTETRICS AND GYNECOLOGY | Facility: CLINIC | Age: 39
End: 2019-09-17

## 2019-09-17 ENCOUNTER — HOSPITAL ENCOUNTER (OUTPATIENT)
Dept: RADIOLOGY | Facility: OTHER | Age: 39
Discharge: HOME | End: 2019-09-17
Attending: OBSTETRICS & GYNECOLOGY
Payer: COMMERCIAL

## 2019-09-17 DIAGNOSIS — N92.1 BREAKTHROUGH BLEEDING ON BIRTH CONTROL PILLS: ICD-10-CM

## 2019-09-17 PROCEDURE — 76830 TRANSVAGINAL US NON-OB: CPT | Mod: TC

## 2019-09-17 PROCEDURE — 76856 US EXAM PELVIC COMPLETE: CPT | Mod: 26,,, | Performed by: RADIOLOGY

## 2019-09-17 PROCEDURE — 76830 TRANSVAGINAL US NON-OB: CPT | Mod: 26,,, | Performed by: RADIOLOGY

## 2019-09-17 PROCEDURE — 76830 US PELVIS COMP WITH TRANSVAG NON-OB (XPD): ICD-10-PCS | Mod: 26,,, | Performed by: RADIOLOGY

## 2019-09-17 PROCEDURE — 76856 US PELVIS COMP WITH TRANSVAG NON-OB (XPD): ICD-10-PCS | Mod: 26,,, | Performed by: RADIOLOGY

## 2019-09-18 ENCOUNTER — PATIENT MESSAGE (OUTPATIENT)
Dept: OBSTETRICS AND GYNECOLOGY | Facility: CLINIC | Age: 39
End: 2019-09-18

## 2019-09-26 ENCOUNTER — PROCEDURE VISIT (OUTPATIENT)
Dept: OBSTETRICS AND GYNECOLOGY | Facility: CLINIC | Age: 39
End: 2019-09-26
Payer: COMMERCIAL

## 2019-09-26 VITALS
BODY MASS INDEX: 26.52 KG/M2 | HEIGHT: 63 IN | DIASTOLIC BLOOD PRESSURE: 82 MMHG | WEIGHT: 149.69 LBS | SYSTOLIC BLOOD PRESSURE: 124 MMHG

## 2019-09-26 DIAGNOSIS — N92.1 BREAKTHROUGH BLEEDING ON BIRTH CONTROL PILLS: ICD-10-CM

## 2019-09-26 DIAGNOSIS — Z32.02 NEGATIVE PREGNANCY TEST: Primary | ICD-10-CM

## 2019-09-26 LAB
B-HCG UR QL: NEGATIVE
CTP QC/QA: YES

## 2019-09-26 PROCEDURE — 81025 POCT URINE PREGNANCY: ICD-10-PCS | Mod: S$GLB,,, | Performed by: OBSTETRICS & GYNECOLOGY

## 2019-09-26 PROCEDURE — 58100 ENDOMETRIAL BIOPSY: ICD-10-PCS | Mod: S$GLB,,, | Performed by: STUDENT IN AN ORGANIZED HEALTH CARE EDUCATION/TRAINING PROGRAM

## 2019-09-26 PROCEDURE — 88305 TISSUE EXAM BY PATHOLOGIST: CPT | Performed by: PATHOLOGY

## 2019-09-26 PROCEDURE — 88305 TISSUE SPECIMEN TO PATHOLOGY, OBSTETRICS/GYNECOLOGY: ICD-10-PCS | Mod: 26,,, | Performed by: PATHOLOGY

## 2019-09-26 PROCEDURE — 88305 TISSUE EXAM BY PATHOLOGIST: CPT | Mod: 26,,, | Performed by: PATHOLOGY

## 2019-09-26 PROCEDURE — 81025 URINE PREGNANCY TEST: CPT | Mod: S$GLB,,, | Performed by: OBSTETRICS & GYNECOLOGY

## 2019-09-26 PROCEDURE — 58100 BIOPSY OF UTERUS LINING: CPT | Mod: S$GLB,,, | Performed by: STUDENT IN AN ORGANIZED HEALTH CARE EDUCATION/TRAINING PROGRAM

## 2019-09-26 NOTE — PROCEDURES
Endometrial biopsy  Date/Time: 9/26/2019 3:00 PM  Performed by: Tiff Major MD  Authorized by: Rebekah York MD       DATE: 09/26/2019    PROCEDURE: Endometrial biopsy    INDICATION: break through bleeding    PATIENT CONSENT:     PRE ENDOMETRIAL BIOPSY COUNSELING:  The patient was informed of the risk of bleeding, infection, uterine perforation and pain and that the test will rule-out endometrial cancer with accuracy greater than 95%. She was counseled on the alternatives to endometrial biopsy.  All of her questions were answered.  Patient gives written consent    ANESTHESIA: None    Labs: UPT performed prior to the procedure was negative.    PROCEDURE NOTE:  The cervix was visualized with a speculum and swabbed with Betadinex3.  The anterior lip of the cervix was grasped with a single toothed tenaculum, and a sterile endometrial pipelle was inserted into the uterus to a sound length of 9 cm. 1 passes were made with the pipelle and light amount of tissue was obtained. The specimen was placed in formalin and sent to Pathology for evaluation.     COMPLICATIONS: None    DISPOSITION: The patient tolerated the above procedure well.      POST ENDOMETRIAL BIOPSY COUNSELING:  - Manage post biopsy cramping with NSAIDs or Tylenol.  - Expect spotting or light bleeding for a few days.  - Report bleeding heavier than a period, fever > 101.0 F, worsening pain or a foul smelling vaginal discharge.      Tiff Major MD 09/26/2019 4:29 PM

## 2019-10-09 ENCOUNTER — TELEPHONE (OUTPATIENT)
Dept: OBSTETRICS AND GYNECOLOGY | Facility: CLINIC | Age: 39
End: 2019-10-09

## 2019-10-09 NOTE — TELEPHONE ENCOUNTER
----- Message from Ramon Perales sent at 10/9/2019 11:23 AM CDT -----  Contact: SWAPNA YOUSSEF [[id number]]  Name of Who is Calling: SWAPNA YOUSSEF [[id number]]      What is the request in detail: Would like to speak with staff in regards to biopsy results. Please advise      Can the clinic reply by [facility]: yes      What Number [facility] Back [facility] [phone]

## 2019-10-09 NOTE — TELEPHONE ENCOUNTER
----- Message from Karyna Guy MA sent at 10/9/2019 11:49 AM CDT -----  Contact: SWAPNA YOUSSEF [[id number]]  Please call pt with EMBX results  ----- Message -----  From: Valente Burgos  Sent: 10/9/2019  11:45 AM CDT  To: Jaylen RAMSAY Staff    Type:  Patient Returning Call    Who Called: SWAPNA YOUSSEF [[id number]]    Who Left Message for Patient: Karyna     Does the patient know what this is regarding?: missed call /test results     Best Call Back Number:    Additional Information:  n/a

## 2020-09-30 ENCOUNTER — APPOINTMENT (OUTPATIENT)
Dept: LAB | Facility: HOSPITAL | Age: 40
End: 2020-09-30
Attending: OBSTETRICS & GYNECOLOGY
Payer: COMMERCIAL

## 2020-09-30 ENCOUNTER — OFFICE VISIT (OUTPATIENT)
Dept: OBSTETRICS AND GYNECOLOGY | Facility: CLINIC | Age: 40
End: 2020-09-30
Payer: COMMERCIAL

## 2020-09-30 VITALS
SYSTOLIC BLOOD PRESSURE: 110 MMHG | BODY MASS INDEX: 28.59 KG/M2 | WEIGHT: 161.38 LBS | HEIGHT: 63 IN | DIASTOLIC BLOOD PRESSURE: 78 MMHG

## 2020-09-30 DIAGNOSIS — K21.9 GASTROESOPHAGEAL REFLUX DISEASE, ESOPHAGITIS PRESENCE NOT SPECIFIED: ICD-10-CM

## 2020-09-30 DIAGNOSIS — R63.5 WEIGHT GAIN: ICD-10-CM

## 2020-09-30 DIAGNOSIS — R10.2 PELVIC PAIN IN FEMALE: ICD-10-CM

## 2020-09-30 DIAGNOSIS — Z30.09 GENERAL COUNSELING FOR PRESCRIPTION OF ORAL CONTRACEPTIVES: ICD-10-CM

## 2020-09-30 DIAGNOSIS — Z01.419 WELL WOMAN EXAM WITH ROUTINE GYNECOLOGICAL EXAM: Primary | ICD-10-CM

## 2020-09-30 LAB
ESTIMATED AVG GLUCOSE: 97 MG/DL (ref 68–131)
HBA1C MFR BLD HPLC: 5 % (ref 4–5.6)
TSH SERPL DL<=0.005 MIU/L-ACNC: 2.01 UIU/ML (ref 0.4–4)

## 2020-09-30 PROCEDURE — 99999 PR PBB SHADOW E&M-EST. PATIENT-LVL IV: CPT | Mod: PBBFAC,,, | Performed by: OBSTETRICS & GYNECOLOGY

## 2020-09-30 PROCEDURE — 99999 PR PBB SHADOW E&M-EST. PATIENT-LVL IV: ICD-10-PCS | Mod: PBBFAC,,, | Performed by: OBSTETRICS & GYNECOLOGY

## 2020-09-30 PROCEDURE — 84443 ASSAY THYROID STIM HORMONE: CPT

## 2020-09-30 PROCEDURE — 99395 PR PREVENTIVE VISIT,EST,18-39: ICD-10-PCS | Mod: S$GLB,,, | Performed by: OBSTETRICS & GYNECOLOGY

## 2020-09-30 PROCEDURE — 83036 HEMOGLOBIN GLYCOSYLATED A1C: CPT

## 2020-09-30 PROCEDURE — 99395 PREV VISIT EST AGE 18-39: CPT | Mod: S$GLB,,, | Performed by: OBSTETRICS & GYNECOLOGY

## 2020-09-30 RX ORDER — OFLOXACIN 3 MG/ML
SOLUTION/ DROPS OPHTHALMIC
COMMUNITY
End: 2020-11-05

## 2020-09-30 RX ORDER — NORGESTREL AND ETHINYL ESTRADIOL 0.3-0.03MG
1 KIT ORAL DAILY
Qty: 94 TABLET | Refills: 4 | Status: SHIPPED | OUTPATIENT
Start: 2020-09-30 | End: 2023-03-13

## 2020-09-30 NOTE — PROGRESS NOTES
SUBJECTIVE:   40 y.o. female   for annual routine Pap and checkup. Patient's last menstrual period was 2020..  She complains of a 20# weight gain, bloating, gerd, and RLQ pain.        History reviewed. No pertinent past medical history.  History reviewed. No pertinent surgical history.  Social History     Socioeconomic History    Marital status:      Spouse name: Not on file    Number of children: Not on file    Years of education: Not on file    Highest education level: Not on file   Occupational History    Occupation:    Social Needs    Financial resource strain: Not on file    Food insecurity     Worry: Not on file     Inability: Not on file    Transportation needs     Medical: Not on file     Non-medical: Not on file   Tobacco Use    Smoking status: Never Smoker    Smokeless tobacco: Never Used   Substance and Sexual Activity    Alcohol use: No    Drug use: No    Sexual activity: Yes     Partners: Male     Birth control/protection: OCP   Lifestyle    Physical activity     Days per week: Not on file     Minutes per session: Not on file    Stress: Not on file   Relationships    Social connections     Talks on phone: Not on file     Gets together: Not on file     Attends [employer] service: Not on file     Active member of club or organization: Not on file     Attends meetings of clubs or organizations: Not on file     Relationship status: Not on file   Other Topics Concern    Not on file   Social History Narrative    Lives w/partner.  Engaged to be  in .  No children.  Originally from [place].     Family History   Problem Relation Age of Onset    Colon cancer Paternal Grandfather     Thyroid disease Mother     Heart disease Father     Hypertension Father     Thyroid nodules Maternal Aunt     Thyroid disease Maternal Grandmother     Thyroid disease Paternal Grandmother     Breast cancer Neg Hx     Eclampsia Neg Hx      labor Neg Hx      OB  History    Para Term  AB Living   0 0 0 0 0 0   SAB TAB Ectopic Multiple Live Births   0 0 0 0           Current Outpatient Medications   Medication Sig Dispense Refill    flu vac [id number] 36mos up,PF, 60 mcg (15 mcg x 4)/0.5 mL Syrg Fluzone Quad (PF) 60 mcg(15 mcgx4)/0.5 mL intramuscular syringe   TO BE ADMINISTERED BY PHARMACIST FOR IMMUNIZATION      norgestrel-ethinyl estradioL (CRYSELLE, Clemencia,) 0.3-30 mg-mcg per tablet Take 1 tablet by mouth once daily. 94 tablet 4    ofloxacin (OCUFLOX) 0.3 % ophthalmic solution ofloxacin 0.3 % eye drops   PLACE 1 DROP INTO THE LEFT EYE EVERY 2 (TWO) HOURS. FOR 7 DAYS       No current facility-administered medications for this visit.      Allergies: Patient has no known allergies.     ROS:  Constitutional: no weight loss, weight gain, fever, fatigue  Eyes:  No vision changes, glasses/contacts  ENT/Mouth: No ulcers, sinus problems, ears ringing, headache  Cardiovascular: No inability to lie flat, chest pain, exercise intolerance, swelling, heart palpitations  Respiratory: No wheezing, coughing blood, shortness of breath, or cough  Gastrointestinal: No diarrhea, bloody stool, nausea/vomiting, constipation, gas, hemorrhoids  Genitourinary: No blood in urine, painful urination, urgency of urination, frequency of urination, incomplete emptying, incontinence, abnormal bleeding, painful periods, heavy periods, vaginal discharge, vaginal odor, painful intercourse, sexual problems, bleeding after intercourse.  Musculoskeletal: No muscle weakness  Skin/Breast: No painful breasts, nipple discharge, masses, rash, ulcers  Neurological: No passing out, seizures, numbness, headache  Endocrine: No diabetes, hypothyroid, hyperthyroid, hot flashes, hair loss, abnormal hair growth, ance  Psychiatric: No depression, crying  Hematologic: No bruises, bleeding, swollen lymph nodes, anemia.      OBJECTIVE:   The patient appears well, alert, oriented x 3, in no distress.  BP  "110/78   Ht 5' 3" (1.6 m)   Wt 73.2 kg (161 lb 6 oz)   LMP 09/14/2020   BMI 28.59 kg/m²   NECK: no thyromegaly, trachea midline  SKIN: no acne, striae, hirsutism  BREAST EXAM: breasts appear normal, no suspicious masses, no skin or nipple changes or axillary nodes  ABDOMEN: no hernias, masses, or hepatosplenomegaly  GENITALIA: normal external genitalia, no erythema, no discharge  URETHRA: normal urethra, normal urethral meatus  VAGINA: Normal  CERVIX: no lesions or cervical motion tenderness  UTERUS: enlarged, 10 weeks size  ADNEXA: normal adnexa and tender on the right    \  ASSESSMENT:   .Akua was seen today for well woman.    Diagnoses and all orders for this visit:    Well woman exam with routine gynecological exam    Weight gain  -     TSH  -     Hemoglobin A1C    General counseling for prescription of oral contraceptives  -     norgestrel-ethinyl estradioL (CRYSELLE, Clemencia,) 0.3-30 mg-mcg per tablet; Take 1 tablet by mouth once daily.    Pelvic pain in female  -     US Pelvis Comp with Transvag NON-OB (xpd); Future    Gastroesophageal reflux disease, esophagitis presence not specified  -     Ambulatory referral/consult to Gastroenterology; Future        "

## 2020-10-01 ENCOUNTER — HOSPITAL ENCOUNTER (OUTPATIENT)
Dept: RADIOLOGY | Facility: OTHER | Age: 40
Discharge: HOME | End: 2020-10-01
Attending: OBSTETRICS & GYNECOLOGY
Payer: COMMERCIAL

## 2020-10-01 DIAGNOSIS — R10.2 PELVIC PAIN IN FEMALE: ICD-10-CM

## 2020-10-01 PROCEDURE — 76856 US EXAM PELVIC COMPLETE: CPT | Mod: 26,,, | Performed by: RADIOLOGY

## 2020-10-01 PROCEDURE — 76830 US PELVIS COMP WITH TRANSVAG NON-OB (XPD): ICD-10-PCS | Mod: 26,,, | Performed by: RADIOLOGY

## 2020-10-01 PROCEDURE — 76856 US PELVIS COMP WITH TRANSVAG NON-OB (XPD): ICD-10-PCS | Mod: 26,,, | Performed by: RADIOLOGY

## 2020-10-01 PROCEDURE — 76830 TRANSVAGINAL US NON-OB: CPT | Mod: 26,,, | Performed by: RADIOLOGY

## 2020-10-01 PROCEDURE — 76830 TRANSVAGINAL US NON-OB: CPT | Mod: TC

## 2020-10-09 ENCOUNTER — TELEPHONE (OUTPATIENT)
Dept: OBSTETRICS AND GYNECOLOGY | Facility: CLINIC | Age: 40
End: 2020-10-09

## 2020-10-12 ENCOUNTER — TELEPHONE (OUTPATIENT)
Dept: OBSTETRICS AND GYNECOLOGY | Facility: HOSPITAL | Age: 40
End: 2020-10-12

## 2020-10-16 ENCOUNTER — PATIENT MESSAGE (OUTPATIENT)
Dept: OBSTETRICS AND GYNECOLOGY | Facility: CLINIC | Age: 40
End: 2020-10-16

## 2020-10-16 NOTE — TELEPHONE ENCOUNTER
Called and discussed ultrasound results.  Counseled on lsc myomectomy.  She will consider and let me know.

## 2020-10-26 ENCOUNTER — PATIENT MESSAGE (OUTPATIENT)
Dept: OBSTETRICS AND GYNECOLOGY | Facility: CLINIC | Age: 40
End: 2020-10-26

## 2020-10-26 ENCOUNTER — TELEPHONE (OUTPATIENT)
Dept: OBSTETRICS AND GYNECOLOGY | Facility: CLINIC | Age: 40
End: 2020-10-26

## 2020-10-26 NOTE — TELEPHONE ENCOUNTER
----- Message from Karyna Guy MA sent at 10/19/2020  4:25 PM CDT -----    Thanks for the call and follow-up Dr. York.  I sincerely appreciate it.      I thought of two questions I should have asked on the call. First, as I look to work schedule and potential scheduling, what is the time frame involved? Are there particular pre-op times with [facility]? Or particular days when you set surgeries? Would you yourself do the surgery?    Second, about the particular method of surgery, for better or worse through my work I was involved in some of the morcellator cases. That has made me disinclined to procedures using the device.  Are there other methods that you use at [facility] for this type of removal?  While you are in the abdominal area can you perform lipo?  (just kidding ... unless you can! :-) )     Thanks so much for the info and call!

## 2020-11-05 ENCOUNTER — OFFICE VISIT (OUTPATIENT)
Dept: OBSTETRICS AND GYNECOLOGY | Facility: CLINIC | Age: 40
End: 2020-11-05
Payer: COMMERCIAL

## 2020-11-05 VITALS
DIASTOLIC BLOOD PRESSURE: 80 MMHG | HEIGHT: 63 IN | BODY MASS INDEX: 28.82 KG/M2 | SYSTOLIC BLOOD PRESSURE: 144 MMHG | WEIGHT: 162.69 LBS

## 2020-11-05 DIAGNOSIS — D25.2 SUBSEROUS LEIOMYOMA OF UTERUS: Primary | ICD-10-CM

## 2020-11-05 PROCEDURE — 99999 PR PBB SHADOW E&M-EST. PATIENT-LVL III: ICD-10-PCS | Mod: PBBFAC,,, | Performed by: OBSTETRICS & GYNECOLOGY

## 2020-11-05 PROCEDURE — 99214 PR OFFICE/OUTPT VISIT, EST, LEVL IV, 30-39 MIN: ICD-10-PCS | Mod: S$GLB,,, | Performed by: OBSTETRICS & GYNECOLOGY

## 2020-11-05 PROCEDURE — 99214 OFFICE O/P EST MOD 30 MIN: CPT | Mod: S$GLB,,, | Performed by: OBSTETRICS & GYNECOLOGY

## 2020-11-05 PROCEDURE — 99999 PR PBB SHADOW E&M-EST. PATIENT-LVL III: CPT | Mod: PBBFAC,,, | Performed by: OBSTETRICS & GYNECOLOGY

## 2021-04-26 ENCOUNTER — PATIENT MESSAGE (OUTPATIENT)
Dept: RESEARCH | Facility: HOSPITAL | Age: 41
End: 2021-04-26

## 2022-01-06 ENCOUNTER — LAB VISIT (OUTPATIENT)
Dept: PRIMARY CARE CLINIC | Facility: CLINIC | Age: 42
End: 2022-01-06
Payer: COMMERCIAL

## 2022-01-06 DIAGNOSIS — Z20.822 CONTACT WITH AND (SUSPECTED) EXPOSURE TO COVID-19: ICD-10-CM

## 2022-01-06 LAB
CTP QC/QA: YES
SARS-COV-2 AG RESP QL IA.RAPID: NEGATIVE

## 2022-01-06 PROCEDURE — 87811 SARS-COV-2 COVID19 W/OPTIC: CPT

## 2022-01-08 ENCOUNTER — PATIENT MESSAGE (OUTPATIENT)
Dept: ADMINISTRATIVE | Facility: OTHER | Age: 42
End: 2022-01-08
Payer: COMMERCIAL

## 2022-01-18 ENCOUNTER — LAB VISIT (OUTPATIENT)
Dept: PRIMARY CARE CLINIC | Facility: CLINIC | Age: 42
End: 2022-01-18
Payer: COMMERCIAL

## 2022-01-18 DIAGNOSIS — Z20.822 CONTACT WITH AND (SUSPECTED) EXPOSURE TO COVID-19: ICD-10-CM

## 2022-01-18 LAB
CTP QC/QA: YES
SARS-COV-2 AG RESP QL IA.RAPID: NEGATIVE

## 2022-01-18 PROCEDURE — 87811 SARS-COV-2 COVID19 W/OPTIC: CPT

## 2022-01-19 ENCOUNTER — OFFICE VISIT (OUTPATIENT)
Dept: URGENT CARE | Facility: CLINIC | Age: 42
End: 2022-01-19
Payer: COMMERCIAL

## 2022-01-19 VITALS
DIASTOLIC BLOOD PRESSURE: 84 MMHG | BODY MASS INDEX: 28.82 KG/M2 | SYSTOLIC BLOOD PRESSURE: 118 MMHG | HEIGHT: 63 IN | HEART RATE: 85 BPM | TEMPERATURE: 99 F | RESPIRATION RATE: 16 BRPM | OXYGEN SATURATION: 98 % | WEIGHT: 162.69 LBS

## 2022-01-19 DIAGNOSIS — H66.002 ACUTE SUPPURATIVE OTITIS MEDIA OF LEFT EAR WITHOUT SPONTANEOUS RUPTURE OF TYMPANIC MEMBRANE, RECURRENCE NOT SPECIFIED: ICD-10-CM

## 2022-01-19 DIAGNOSIS — R05.9 COUGH: Primary | ICD-10-CM

## 2022-01-19 DIAGNOSIS — H92.02 EAR PAIN, LEFT: ICD-10-CM

## 2022-01-19 LAB
CTP QC/QA: YES
SARS-COV-2 RDRP RESP QL NAA+PROBE: NEGATIVE

## 2022-01-19 PROCEDURE — 1159F MED LIST DOCD IN RCRD: CPT | Mod: CPTII,S$GLB,, | Performed by: INTERNAL MEDICINE

## 2022-01-19 PROCEDURE — U0002: ICD-10-PCS | Mod: QW,S$GLB,, | Performed by: INTERNAL MEDICINE

## 2022-01-19 PROCEDURE — 1160F RVW MEDS BY RX/DR IN RCRD: CPT | Mod: CPTII,S$GLB,, | Performed by: INTERNAL MEDICINE

## 2022-01-19 PROCEDURE — 3079F PR MOST RECENT DIASTOLIC BLOOD PRESSURE 80-89 MM HG: ICD-10-PCS | Mod: CPTII,S$GLB,, | Performed by: INTERNAL MEDICINE

## 2022-01-19 PROCEDURE — 1159F PR MEDICATION LIST DOCUMENTED IN MEDICAL RECORD: ICD-10-PCS | Mod: CPTII,S$GLB,, | Performed by: INTERNAL MEDICINE

## 2022-01-19 PROCEDURE — 3074F SYST BP LT 130 MM HG: CPT | Mod: CPTII,S$GLB,, | Performed by: INTERNAL MEDICINE

## 2022-01-19 PROCEDURE — 1160F PR REVIEW ALL MEDS BY PRESCRIBER/CLIN PHARMACIST DOCUMENTED: ICD-10-PCS | Mod: CPTII,S$GLB,, | Performed by: INTERNAL MEDICINE

## 2022-01-19 PROCEDURE — 99214 OFFICE O/P EST MOD 30 MIN: CPT | Mod: 25,S$GLB,, | Performed by: INTERNAL MEDICINE

## 2022-01-19 PROCEDURE — 99214 PR OFFICE/OUTPT VISIT, EST, LEVL IV, 30-39 MIN: ICD-10-PCS | Mod: 25,S$GLB,, | Performed by: INTERNAL MEDICINE

## 2022-01-19 PROCEDURE — 96372 THER/PROPH/DIAG INJ SC/IM: CPT | Mod: S$GLB,,, | Performed by: INTERNAL MEDICINE

## 2022-01-19 PROCEDURE — 3074F PR MOST RECENT SYSTOLIC BLOOD PRESSURE < 130 MM HG: ICD-10-PCS | Mod: CPTII,S$GLB,, | Performed by: INTERNAL MEDICINE

## 2022-01-19 PROCEDURE — 3008F PR BODY MASS INDEX (BMI) DOCUMENTED: ICD-10-PCS | Mod: CPTII,S$GLB,, | Performed by: INTERNAL MEDICINE

## 2022-01-19 PROCEDURE — 3008F BODY MASS INDEX DOCD: CPT | Mod: CPTII,S$GLB,, | Performed by: INTERNAL MEDICINE

## 2022-01-19 PROCEDURE — 96372 PR INJECTION,THERAP/PROPH/DIAG2ST, IM OR SUBCUT: ICD-10-PCS | Mod: S$GLB,,, | Performed by: INTERNAL MEDICINE

## 2022-01-19 PROCEDURE — 3079F DIAST BP 80-89 MM HG: CPT | Mod: CPTII,S$GLB,, | Performed by: INTERNAL MEDICINE

## 2022-01-19 PROCEDURE — U0002 COVID-19 LAB TEST NON-CDC: HCPCS | Mod: QW,S$GLB,, | Performed by: INTERNAL MEDICINE

## 2022-01-19 RX ORDER — AMOXICILLIN AND CLAVULANATE POTASSIUM 875; 125 MG/1; MG/1
1 TABLET, FILM COATED ORAL EVERY 12 HOURS
Qty: 20 TABLET | Refills: 0 | Status: SHIPPED | OUTPATIENT
Start: 2022-01-19 | End: 2022-01-29

## 2022-01-19 RX ORDER — DEXAMETHASONE SODIUM PHOSPHATE 100 MG/10ML
10 INJECTION INTRAMUSCULAR; INTRAVENOUS
Status: COMPLETED | OUTPATIENT
Start: 2022-01-19 | End: 2022-01-19

## 2022-01-19 RX ADMIN — DEXAMETHASONE SODIUM PHOSPHATE 10 MG: 100 INJECTION INTRAMUSCULAR; INTRAVENOUS at 09:01

## 2022-01-19 NOTE — PROGRESS NOTES
"Subjective:       Patient ID: Akua Duncan is a 41 y.o. female.    Vitals:  height is 5' 3" (1.6 m) and weight is 73.8 kg (162 lb 11.2 oz). Her temperature is 98.6 °F (37 °C). Her blood pressure is 118/84 and her pulse is 85. Her respiration is 16 and oxygen saturation is 98%.     Chief Complaint: Sore Throat    Patient reports ear ache in left ear. Sore throat for a few days. Post nasal drip.    Sore Throat   This is a new problem. The current episode started in the past 7 days. Neither side of throat is experiencing more pain than the other. There has been no fever. The pain is at a severity of 4/10. The pain is mild. Treatments tried: Mucinex. The treatment provided mild relief.       HENT: Positive for sore throat.        Objective:      Physical Exam   HENT:   Head: Normocephalic and atraumatic.   Ears:   Right Ear: Hearing, tympanic membrane, external ear and ear canal normal.   Left Ear: Tympanic membrane is injected, erythematous and retracted. A middle ear effusion is present. Decreased hearing is noted.   Nose: Mucosal edema, rhinorrhea and purulent discharge present. Right sinus exhibits maxillary sinus tenderness. Left sinus exhibits maxillary sinus tenderness.   Mouth/Throat: Mucous membranes are moist. Oropharynx is clear.   Eyes: Conjunctivae are normal. Pupils are equal, round, and reactive to light.      extraocular movement intact   Neck: Neck supple.   Cardiovascular: Normal rate, regular rhythm, normal heart sounds and normal pulses.   Pulmonary/Chest: Effort normal and breath sounds normal.   Abdominal: Normal appearance.   Neurological: She is alert.   Nursing note and vitals reviewed.        Assessment:       1. Cough    2. Ear pain, left    3. Acute suppurative otitis media of left ear without spontaneous rupture of tympanic membrane, recurrence not specified          Plan:         Cough  -     POCT COVID-19 Rapid Screening    Ear pain, left    Acute suppurative otitis media of left ear " without spontaneous rupture of tympanic membrane, recurrence not specified  -     dexamethasone injection 10 mg  -     amoxicillin-clavulanate 875-125mg (AUGMENTIN) 875-125 mg per tablet; Take 1 tablet by mouth every 12 (twelve) hours. for 10 days  Dispense: 20 tablet; Refill: 0

## 2022-12-02 ENCOUNTER — OFFICE VISIT (OUTPATIENT)
Dept: URGENT CARE | Facility: CLINIC | Age: 42
End: 2022-12-02
Payer: COMMERCIAL

## 2022-12-02 VITALS
RESPIRATION RATE: 18 BRPM | BODY MASS INDEX: 28.82 KG/M2 | HEART RATE: 88 BPM | SYSTOLIC BLOOD PRESSURE: 118 MMHG | WEIGHT: 162.69 LBS | TEMPERATURE: 99 F | OXYGEN SATURATION: 98 % | HEIGHT: 63 IN | DIASTOLIC BLOOD PRESSURE: 87 MMHG

## 2022-12-02 DIAGNOSIS — H57.89 EYE IRRITATION: ICD-10-CM

## 2022-12-02 DIAGNOSIS — H18.821 CORNEAL ABRASION DUE TO CONTACT LENS, RIGHT: Primary | ICD-10-CM

## 2022-12-02 PROCEDURE — 3074F PR MOST RECENT SYSTOLIC BLOOD PRESSURE < 130 MM HG: ICD-10-PCS | Mod: CPTII,S$GLB,, | Performed by: INTERNAL MEDICINE

## 2022-12-02 PROCEDURE — 3074F SYST BP LT 130 MM HG: CPT | Mod: CPTII,S$GLB,, | Performed by: INTERNAL MEDICINE

## 2022-12-02 PROCEDURE — 99215 PR OFFICE/OUTPT VISIT, EST, LEVL V, 40-54 MIN: ICD-10-PCS | Mod: S$GLB,,, | Performed by: INTERNAL MEDICINE

## 2022-12-02 PROCEDURE — 3079F DIAST BP 80-89 MM HG: CPT | Mod: CPTII,S$GLB,, | Performed by: INTERNAL MEDICINE

## 2022-12-02 PROCEDURE — 99215 OFFICE O/P EST HI 40 MIN: CPT | Mod: S$GLB,,, | Performed by: INTERNAL MEDICINE

## 2022-12-02 PROCEDURE — 3008F PR BODY MASS INDEX (BMI) DOCUMENTED: ICD-10-PCS | Mod: CPTII,S$GLB,, | Performed by: INTERNAL MEDICINE

## 2022-12-02 PROCEDURE — 3008F BODY MASS INDEX DOCD: CPT | Mod: CPTII,S$GLB,, | Performed by: INTERNAL MEDICINE

## 2022-12-02 PROCEDURE — 3079F PR MOST RECENT DIASTOLIC BLOOD PRESSURE 80-89 MM HG: ICD-10-PCS | Mod: CPTII,S$GLB,, | Performed by: INTERNAL MEDICINE

## 2022-12-02 PROCEDURE — 1159F MED LIST DOCD IN RCRD: CPT | Mod: CPTII,S$GLB,, | Performed by: INTERNAL MEDICINE

## 2022-12-02 PROCEDURE — 1159F PR MEDICATION LIST DOCUMENTED IN MEDICAL RECORD: ICD-10-PCS | Mod: CPTII,S$GLB,, | Performed by: INTERNAL MEDICINE

## 2022-12-02 RX ORDER — BESIFLOXACIN 6 MG/ML
SUSPENSION OPHTHALMIC
COMMUNITY
Start: 2022-12-02 | End: 2023-03-13

## 2022-12-02 RX ORDER — NATAMYCIN 50 MG/ML
1 SUSPENSION/ DROPS OPHTHALMIC
COMMUNITY
Start: 2022-12-02 | End: 2023-03-13

## 2022-12-02 NOTE — PATIENT INSTRUCTIONS
Continue your eye drops/medications as prescribed by Dr. Black. Follow up with him on Sunday as scheduled. \    The redness and irritation of the eye may be from the Natacyn. Continue this medication as prescribed.

## 2022-12-02 NOTE — PROGRESS NOTES
"Subjective:       Patient ID: Akua Duncan is a 42 y.o. female.    Vitals:  height is 5' 3" (1.6 m) and weight is 73.8 kg (162 lb 11.2 oz). Her tympanic temperature is 99 °F (37.2 °C). Her blood pressure is 118/87 and her pulse is 88. Her respiration is 18 and oxygen saturation is 98%.     Chief Complaint: Eye Problem    Patient states that she has been experiencing right occular irritation starting Tuesday. Patient states that her eye is now burning and is experiencing eye pain.        Patient is currently under the care of Dr. Black at eye [facility] for a corneal abrasion. On Besivance hourly and Natacyn hourly. Started the natacyn today. And has noticed increased redness and stinging sensation in the eye. Blurry vision has improved.     Eye Problem   The right eye is affected. This is a new problem. The problem occurs constantly. The problem has been gradually worsening. There was no injury mechanism. The pain is at a severity of 7/10. The pain is severe. There is No known exposure to pink eye. She Wears contacts. Associated symptoms include blurred vision, eye redness and a foreign body sensation. She has tried commercial eye wash and eye drops for the symptoms. The treatment provided mild relief.   Eyes:  Positive for eye redness and blurred vision.     Objective:      Physical Exam   Constitutional: She is oriented to person, place, and time.  Non-toxic appearance. She does not appear ill. No distress.   HENT:   Head: Normocephalic and atraumatic.   Ears:   Right Ear: External ear normal.   Left Ear: External ear normal.   Nose: Nose normal. No congestion.   Eyes: Right conjunctiva is injected. Right eye exhibits normal extraocular motion. Right pupil is sluggish (dilated due to atropine from Dr. Black's office). Left pupil is round, reactive and not sluggish.   Fundoscopic exam:       The right eye shows no exudate.   Slit lamp exam:       The right eye shows corneal abrasion and corneal ulcer. The " right eye shows no foreign body, no hyphema and no hypopyon.   Pulmonary/Chest: No respiratory distress.   Abdominal: Normal appearance.   Neurological: She is alert and oriented to person, place, and time.   Skin: Skin is warm, dry and not diaphoretic.   Psychiatric: Her behavior is normal. Mood, judgment and thought content normal.   Vitals reviewed.      Assessment:       1. Corneal abrasion due to contact lens, right    2. Eye irritation          Plan:         Corneal abrasion due to contact lens, right    Eye irritation       Discussed with Dr. Hernandez of [facility]. Discussed the patient's current treatment regimen. She is on besivance and natacyn hourly for a corneal abrasion. Under Care of Dr. Black. Started natacyn today, Dr. Hernandez stated her new irritation and redness was likely due to natacyn but that she should continue all medications as prescribed and follow up with Dr. Black on Sunday as scheduled. No need for ER referral tonight. Patient understood all plan of care and my discussion with her.

## 2023-03-13 ENCOUNTER — OFFICE VISIT (OUTPATIENT)
Dept: SPINE | Facility: CLINIC | Age: 43
End: 2023-03-13
Payer: COMMERCIAL

## 2023-03-13 VITALS
SYSTOLIC BLOOD PRESSURE: 119 MMHG | DIASTOLIC BLOOD PRESSURE: 77 MMHG | HEIGHT: 63 IN | WEIGHT: 159.75 LBS | HEART RATE: 87 BPM | BODY MASS INDEX: 28.3 KG/M2

## 2023-03-13 DIAGNOSIS — M54.9 DORSALGIA, UNSPECIFIED: ICD-10-CM

## 2023-03-13 DIAGNOSIS — M54.50 ACUTE BILATERAL LOW BACK PAIN WITHOUT SCIATICA: ICD-10-CM

## 2023-03-13 DIAGNOSIS — M54.6 ACUTE BILATERAL THORACIC BACK PAIN: ICD-10-CM

## 2023-03-13 DIAGNOSIS — M79.18 MYOFASCIAL PAIN: ICD-10-CM

## 2023-03-13 DIAGNOSIS — M53.3 COCCYDYNIA: Primary | ICD-10-CM

## 2023-03-13 PROCEDURE — 1160F RVW MEDS BY RX/DR IN RCRD: CPT | Mod: CPTII,S$GLB,, | Performed by: NURSE PRACTITIONER

## 2023-03-13 PROCEDURE — 3078F DIAST BP <80 MM HG: CPT | Mod: CPTII,S$GLB,, | Performed by: NURSE PRACTITIONER

## 2023-03-13 PROCEDURE — 99999 PR PBB SHADOW E&M-EST. PATIENT-LVL IV: CPT | Mod: PBBFAC,,, | Performed by: NURSE PRACTITIONER

## 2023-03-13 PROCEDURE — 1159F MED LIST DOCD IN RCRD: CPT | Mod: CPTII,S$GLB,, | Performed by: NURSE PRACTITIONER

## 2023-03-13 PROCEDURE — 3078F PR MOST RECENT DIASTOLIC BLOOD PRESSURE < 80 MM HG: ICD-10-PCS | Mod: CPTII,S$GLB,, | Performed by: NURSE PRACTITIONER

## 2023-03-13 PROCEDURE — 1160F PR REVIEW ALL MEDS BY PRESCRIBER/CLIN PHARMACIST DOCUMENTED: ICD-10-PCS | Mod: CPTII,S$GLB,, | Performed by: NURSE PRACTITIONER

## 2023-03-13 PROCEDURE — 3008F PR BODY MASS INDEX (BMI) DOCUMENTED: ICD-10-PCS | Mod: CPTII,S$GLB,, | Performed by: NURSE PRACTITIONER

## 2023-03-13 PROCEDURE — 99204 PR OFFICE/OUTPT VISIT, NEW, LEVL IV, 45-59 MIN: ICD-10-PCS | Mod: S$GLB,,, | Performed by: NURSE PRACTITIONER

## 2023-03-13 PROCEDURE — 1159F PR MEDICATION LIST DOCUMENTED IN MEDICAL RECORD: ICD-10-PCS | Mod: CPTII,S$GLB,, | Performed by: NURSE PRACTITIONER

## 2023-03-13 PROCEDURE — 99999 PR PBB SHADOW E&M-EST. PATIENT-LVL IV: ICD-10-PCS | Mod: PBBFAC,,, | Performed by: NURSE PRACTITIONER

## 2023-03-13 PROCEDURE — 3074F PR MOST RECENT SYSTOLIC BLOOD PRESSURE < 130 MM HG: ICD-10-PCS | Mod: CPTII,S$GLB,, | Performed by: NURSE PRACTITIONER

## 2023-03-13 PROCEDURE — 3008F BODY MASS INDEX DOCD: CPT | Mod: CPTII,S$GLB,, | Performed by: NURSE PRACTITIONER

## 2023-03-13 PROCEDURE — 99204 OFFICE O/P NEW MOD 45 MIN: CPT | Mod: S$GLB,,, | Performed by: NURSE PRACTITIONER

## 2023-03-13 PROCEDURE — 3074F SYST BP LT 130 MM HG: CPT | Mod: CPTII,S$GLB,, | Performed by: NURSE PRACTITIONER

## 2023-03-13 RX ORDER — DICLOFENAC SODIUM 75 MG/1
75 TABLET, DELAYED RELEASE ORAL 2 TIMES DAILY PRN
Qty: 60 TABLET | Refills: 1 | Status: SHIPPED | OUTPATIENT
Start: 2023-03-13 | End: 2024-02-20 | Stop reason: ALTCHOICE

## 2023-03-13 RX ORDER — IBUPROFEN 800 MG/1
800 TABLET ORAL
COMMUNITY
Start: 2023-03-07 | End: 2023-03-13

## 2023-03-13 RX ORDER — METHOCARBAMOL 750 MG/1
750 TABLET, FILM COATED ORAL 2 TIMES DAILY PRN
COMMUNITY
Start: 2023-03-07 | End: 2023-03-13

## 2023-03-13 RX ORDER — METHYLPREDNISOLONE 4 MG/1
TABLET ORAL
Qty: 21 EACH | Refills: 0 | Status: SHIPPED | OUTPATIENT
Start: 2023-03-13 | End: 2023-04-03

## 2023-03-13 RX ORDER — TIZANIDINE 4 MG/1
4 TABLET ORAL NIGHTLY PRN
Qty: 30 TABLET | Refills: 1 | Status: SHIPPED | OUTPATIENT
Start: 2023-03-13 | End: 2024-02-20 | Stop reason: ALTCHOICE

## 2023-03-13 NOTE — PROGRESS NOTES
Subjective:      Patient ID: Akua Duncan is a 43 y.o. female.    Chief Complaint: Spine Injury (Fell on tailbone about 2 weeks ago)    HPI Ms. Duncan is a 43 year old female here for evaluation of tailbone pain.     Hx fall injury on stairs on 3/4/23  Notes persistent mid-low back and tailbone pain since the fall.   Pain/pressure with BM, pulling sensation  Denies radicular leg pain  Went to  for xrays, prescribed robaxin and ibuprofen without much benefit    History reviewed. No pertinent past medical history.    History reviewed. No pertinent surgical history.    Family History   Problem Relation Age of Onset    Colon cancer Paternal Grandfather     Thyroid disease Mother     Heart disease Father     Hypertension Father     Thyroid nodules Maternal Aunt     Thyroid disease Maternal Grandmother     Thyroid disease Paternal Grandmother     Breast cancer Neg Hx     Eclampsia Neg Hx      labor Neg Hx        Social History     Socioeconomic History    Marital status:    Occupational History    Occupation:    Tobacco Use    Smoking status: Never    Smokeless tobacco: Never   Substance and Sexual Activity    Alcohol use: No    Drug use: No    Sexual activity: Yes     Partners: Male     Birth control/protection: OCP   Social History Narrative    Lives w/partner.  Engaged to be  in .  No children.  Originally from [place].       Current Outpatient Medications   Medication Sig Dispense Refill    ibuprofen (ADVIL,MOTRIN) 800 MG tablet Take 800 mg by mouth.      methocarbamoL (ROBAXIN) 750 MG Tab Take 750 mg by mouth 2 (two) times daily as needed.       No current facility-administered medications for this visit.       Review of patient's allergies indicates:  No Known Allergies      Review of Systems   Constitutional: Negative for fever.   Cardiovascular:  Negative for chest pain.   Respiratory:  Negative for shortness of breath.    Musculoskeletal:  Positive for back pain (tailbone  pain) and falls.   Gastrointestinal:  Negative for abdominal pain, bowel incontinence, nausea and vomiting.   Genitourinary:  Negative for bladder incontinence.   Neurological:  Negative for numbness and paresthesias.       Objective:        General: Akua is well-developed, well-nourished, appears stated age, in no acute distress, alert and oriented to time, place and person.     General    Vitals reviewed.  Constitutional: She is oriented to person, place, and time. She appears well-developed and well-nourished.   HENT:   Head: Atraumatic.   Nose: Nose normal.   Eyes: Conjunctivae are normal.   Cardiovascular:  Normal rate.            Pulmonary/Chest: Effort normal.   Neurological: She is alert and oriented to person, place, and time.   Psychiatric: She has a normal mood and affect. Her behavior is normal. Judgment and thought content normal.     General Musculoskeletal Exam   Gait: normal     Back (L-Spine & T-Spine) / Neck (C-Spine) Exam     Tenderness Right paramedian tenderness of the Lower L-Spine, Sacrum and Lower T-Spine. Left paramedian tenderness of the Lower L-Spine, Sacrum and Lower T-Spine.     Back (L-Spine & T-Spine) Range of Motion   Extension:  20   Flexion:  90     Spinal Sensation   Right Side Sensation  L-Spine Level: normal  S-Spine Level: normal  Left Side Sensation  L-Spine Level: normal  S-Spine Level: normal    Other   She has no scoliosis .  Spinal Kyphosis:  Absent    Comments:  Localized swelling over coccyx, no ecchymosis  TTP over mid-low back, coccyx      Muscle Strength   Right Upper Extremity   Biceps: 5/5   Deltoid:  5/5  Triceps:  5/5  Left Upper Extremity  Biceps: 5/5   Deltoid:  5/5  Triceps:  5/5  Right Lower Extremity   Hip Flexion: 5/5   Quadriceps:  5/5   Ankle Dorsiflexion:  5/5   Anterior tibial:  5/5   EHL:  5/5  Left Lower Extremity   Hip Flexion: 5/5   Quadriceps:  5/5   Ankle Dorsiflexion:  5/5   Anterior tibial:  5/5   EHL:  5/5    Reflexes     Left Side  Biceps:   2+  Brachioradialis:  2+  Achilles:  2+  Left Ram's Sign:  Absent  Babinski Sign:  absent    Right Side   Biceps:  2+  Brachioradialis:  2+  Achilles:  2+  Right Ram's Sign:  absent  Babinski Sign:  absent    Vascular Exam     Right Pulses        Carotid:                  2+    Left Pulses        Carotid:                  2+            Assessment:       1. Coccydynia    2. Dorsalgia, unspecified    3. Acute bilateral thoracic back pain    4. Myofascial pain    5. Acute bilateral low back pain without sciatica           Plan:          Prior records reviewed today  We discussed back pain and the nature of back pain.  We discussed that it is not one thing that causes the pain but an accumulation of multiple things that we do.    Order thoracic, lumbar, and sacrum xrays  Referral to physical therapy for the mid to low back pain  Rx medrol dose pack for pain and inflammation symptoms  Change ibuprofen to diclofenac 75mg BID PRN pain. Hold until steroid complete  Change robaxin to zanaflex 4mg nightly as needed for muscle pain.   We discussed posture sitting and the importance of trying to sit better.    We discussed the benefits of therapy and exercise and continuing to move.   Consider further imaging if no improvement with conservative care  May consider ganglion impar block if no improvement with conservative care  RTC for followup in 8 weeks    More than 50% of the total time  of 45 minutes was spent face to face in counseling on diagnosis and treatment options. I also counseled patient  on common and most usual side effect of prescribed medications.  I reviewed Primary care , and other specialty's notes to better coordinate patient's care. All questions were answered, and patient voiced understanding.      Follow-up: Follow up in about 8 weeks (around 5/8/2023). If there are any questions prior to this, the patient was instructed to contact the office.

## 2023-04-18 ENCOUNTER — CLINICAL SUPPORT (OUTPATIENT)
Dept: REHABILITATION | Facility: HOSPITAL | Age: 43
End: 2023-04-18
Attending: NURSE PRACTITIONER
Payer: COMMERCIAL

## 2023-04-18 DIAGNOSIS — M54.9 DORSALGIA, UNSPECIFIED: ICD-10-CM

## 2023-04-18 DIAGNOSIS — M54.6 ACUTE BILATERAL THORACIC BACK PAIN: ICD-10-CM

## 2023-04-18 PROCEDURE — 97161 PT EVAL LOW COMPLEX 20 MIN: CPT | Mod: PO

## 2023-04-18 PROCEDURE — 97110 THERAPEUTIC EXERCISES: CPT | Mod: PO

## 2023-04-18 PROCEDURE — 97140 MANUAL THERAPY 1/> REGIONS: CPT | Mod: PO

## 2023-04-18 NOTE — PLAN OF CARE
[facility]   Physical Therapy Initial Evaluation       Name: Akua Duncan  Clinic Number: [id number]    Therapy Diagnosis:   Encounter Diagnoses   Name Primary?    Dorsalgia, unspecified     Acute bilateral thoracic back pain         Physician: Shelley Klein, NP    Physician Orders: PT Eval and Treat   Medical Diagnosis from Referral: M54.9 (ICD-10-CM) - Dorsalgia, unspecified M47.817 (ICD-10-CM) - Spondylosis without myelopathy or radiculopathy, lumbosacral region M54.6 (ICD-10-CM) - Acute bilateral thoracic back pain   Evaluation Date: 4/18/2023  Authorization Period Expiration: 10/18/2023  Plan of Care Expiration: 7/18/2023  Progress Note Due: 5/18/2023  Visit # / Visits authorized: 1/ pending   FOTO: 1/3    Precautions: Standard     Time In: 150 PM  Time Out: 230 PM  Total Appointment Time (timed & untimed codes): 40 minutes      SUBJECTIVE     Date of onset: early March    History of current condition - Akua reports: she had a bad fall onto her mid back and tailbone area where she started to get better but she continues to have a strange pressure when sitting long periods of time. She can walk without issues but will have pain by the end of walks. When she lifts small items she has some pain and discomfort, and she does not have any idea of when the pain with increase. She does not call it a real pain but it is more of an irritation.When she extends her knee as to stretch her HS she feels a pulling into the back. She is avoiding biking and lifting at this time and she has not tried gardening yet. Walking up a flight of stairs can cause some discomfort as well.     Falls: 1 fall in March that brought her here    Imaging: xrays: Mild degenerative changes most pronounced at the L4-5 and L5-S1 levels     Prior Therapy: none  Social History: 3-4 steps lives with their spouse  Occupation:   Prior Level of Function: was able to cycle, garden and climb stairs  Current Level  of Function: challenges with sitting / walking long periods of time    Pain:  Current 2/10, worst 10/10, best 1/10   Location: central low back and sacrum    Description: pressure, soreness, dull achiness  Aggravating Factors: Sitting, Standing, Laying, Bending, Walking, and Lifting  Easing Factors: change positions, meds to help sleep, heat     Patients goals: be able to sit and walk for long periods of time, return to cycling and gardening     Medical History:   No past medical history on file.    Surgical History:   Akua Duncan  has no past surgical history on file.    Medications:   Akua has a current medication list which includes the following prescription(s): diclofenac and tizanidine.    Allergies:   Review of patient's allergies indicates:  No Known Allergies       OBJECTIVE       Posture Alignment: min slumped sitting posture    LUMBAR SPINE AROM:   Flexion: 50% with pulling and burning   Extension: 50% with anticipation of pain   Left Sidebend: WFL   Right Sidebend: WFL   Left Rotation: Limited with inc soreness   Right Rotation: WFL     SEGMENTAL MOBILITY: mod post innominate rotational glides, min sacral extension limitation due to sensitivity to palpation    LOWER EXTREMITY STRENGTH:   Left Right   Knee Flex 4+/5 4+/5   Knee Ext 4+/5 4+/5     Hip Flexor 4+/5 4+/5   Hip IR 4+/5 4+/5   Hip ER 4+/5 4+/5       Palpation: tenderness present over left side quadratus lumborum, paraspinals, sig tenderness present left side piriformis and glute medius     Flexibility:mod HS tightness L>R sides    Special Tests:   Left Right   Slump positive Slightly positive   SLR negative negative   BKFO negative negative     GAIT: Akua ambulates with no assistive device with slight antalgic gait with shortened step length bilaterally.       Pt/family was provided educational information, including: role of PT, goals for PT, scheduling - pt verbalized understanding. Discussed insurance limitations with pt.        Limitation/Restriction for FOTO  Survey    Therapist reviewed FOTO scores for Akua Duncan on 4/18/2023.   FOTO documents entered into [employer] - see Media section.    Limitation Score: 43 %         TREATMENT     Total Treatment time (time-based codes) separate from Evaluation: 20 minutes      Akua received the treatments listed below:      therapeutic exercises to develop strength, endurance, ROM, flexibility, posture, and core stabilization for 12 minutes including:  PPT  Clamshell  Sciatic nerve glide in sitting position  Education (see below)    manual therapy techniques: Joint mobilizations, Manual traction, Myofacial release, Soft tissue Mobilization, and Friction Massage were applied to the: Left hip and lumbar spine for 10 minutes, including:  STM to left side lumbar paraspinals, quadratus lumborum, piriformis, glute medius  UPA L1-4 grade 2  Post innominate rotational glide  Sacral flex and ext    PATIENT EDUCATION AND HOME EXERCISES     Education provided:   - educated on anatomy and referral of the SI ligament. Discussed possibility of deep contusion or bone bruise present, educated on POC and goals of therapy    Written Home Exercises Provided: yes. Exercises were reviewed and Akua was able to demonstrate them prior to the end of the session.  Akua demonstrated good  understanding of the education provided. See EMR under Patient Instructions for exercises provided during therapy sessions.    ASSESSMENT     Akua is a 43 y.o. female referred to outpatient Physical Therapy with a medical diagnosis of M54.9 (ICD-10-CM) - Dorsalgia, unspecified M47.817 (ICD-10-CM) - Spondylosis without myelopathy or radiculopathy, lumbosacral region M54.6 (ICD-10-CM) - Acute bilateral thoracic back pain . Patient presents with left side SIJ stiffness and increased tenderness and muscle tightness over the left side SIJ and surrounding muscles. She responded well to initial sessions focused on SIJ and hip mobility  and core muscle engagement. She also responded well to sciatic nerve glides that she can do while in a seated position at work to relieve stress when stuck sitting for long periods of time    Patient prognosis is Good.   Patient will benefit from skilled outpatient Physical Therapy to address the deficits stated above and in the chart below, provide patient /family education, and to maximize patientt's level of independence.     Plan of care discussed with patient: Yes  Patient's spiritual, cultural and educational needs considered and patient is agreeable to the plan of care and goals as stated below:     Anticipated Barriers for therapy: none    Medical Necessity is demonstrated by the following  History  Co-morbidities and personal factors that may impact the plan of care Co-morbidities:   none    Personal Factors:   no deficits     low   Examination  Body Structures and Functions, activity limitations and participation restrictions that may impact the plan of care Body Regions:   back  lower extremities    Body Systems:    gross symmetry  ROM  strength  balance  gait  transfers    Participation Restrictions:   Pain limiting    Activity limitations:   Learning and applying knowledge  no deficits    General Tasks and Commands  no deficits    Communication  no deficits    Mobility  lifting and carrying objects  walking  driving (bike, car, motorcycle)    Self care  dressing    Domestic Life  doing house work (cleaning house, washing dishes, laundry)    Interactions/Relationships  no deficits    Life Areas  no deficits    Community and Social Life  no deficits         low   Clinical Presentation stable and uncomplicated low   Decision Making/ Complexity Score: low     Goals:  Short Term Goals: 4 weeks   Patient will show 50% reduction in left side lumbar spine tone  Patient will show 50% improvement in joint mobility of the SIJ  Patient will be able to sit > 60 minutes before onset of pain  Patient will show 50%  reduction in tenderness in the hip musculature    Long Term Goals: 8 weeks   Patient will be able to walk > 2 miles before onset of pain  Patient will be able to sit > 2 hours for work related duties without pain increasing  Patient will be able to climb and descend 2 flights of stairs without pain increasing  50% FOTO score improvement    PLAN   Plan of care Certification: 4/18/2023 to 7/18/2023.    Outpatient Physical Therapy 2 times weekly for 8 weeks to include the following interventions: Gait Training, Manual Therapy, Moist Heat/ Ice, Neuromuscular Re-ed, Patient Education, Self Care, Therapeutic Activities, and Therapeutic Exercise.     Brinda Dexter, PT      I CERTIFY THE NEED FOR THESE SERVICES FURNISHED UNDER THIS PLAN OF TREATMENT AND WHILE UNDER MY CARE   Physician's comments:     Physician's Signature: ___________________________________________________

## 2023-04-25 ENCOUNTER — CLINICAL SUPPORT (OUTPATIENT)
Dept: REHABILITATION | Facility: HOSPITAL | Age: 43
End: 2023-04-25
Payer: COMMERCIAL

## 2023-04-25 DIAGNOSIS — M54.9 BACK PAIN, UNSPECIFIED BACK LOCATION, UNSPECIFIED BACK PAIN LATERALITY, UNSPECIFIED CHRONICITY: ICD-10-CM

## 2023-04-25 DIAGNOSIS — S30.0XXA: ICD-10-CM

## 2023-04-25 PROCEDURE — 97140 MANUAL THERAPY 1/> REGIONS: CPT | Mod: PO

## 2023-04-25 PROCEDURE — 97112 NEUROMUSCULAR REEDUCATION: CPT | Mod: PO

## 2023-04-25 PROCEDURE — 97110 THERAPEUTIC EXERCISES: CPT | Mod: PO

## 2023-04-25 NOTE — PROGRESS NOTES
[facility]   Physical Therapy Treatment Note      [id number]    Therapy Diagnosis:   Encounter Diagnoses   Name Primary?    Back pain, unspecified back location, unspecified back pain laterality, unspecified chronicity     Sacral bruising, initial encounter      Physician: Shelley Klein NP    Visit Date: 4/25/2023  Physician Orders: PT Eval and Treat   Medical Diagnosis from Referral: M54.9 (ICD-10-CM) - Dorsalgia, unspecified M47.817 (ICD-10-CM) - Spondylosis without myelopathy or radiculopathy, lumbosacral region M54.6 (ICD-10-CM) - Acute bilateral thoracic back pain   Evaluation Date: 4/18/2023  Authorization Period Expiration: 10/18/2023  Plan of Care Expiration: 7/18/2023  Progress Note Due: 5/18/2023  Visit # / Visits authorized: 1/ pending   FOTO: 1/3     Precautions: Standard     PTA Visit #: 0/5     Time In: 915 am  Time Out: 1000 am  Total Billable Time: 45 minutes    Subjective     Pt reports: doing more around the house and feeling a little less soreness at this time after her increase in activity, maybe changing jobs and may have a gap in insurance  She was compliant with home exercise program.  Response to previous treatment: no change  Functional change: in progress    Pain: 3/10  Location: central back  and buttocks      Objective      Objective Measures updated at progress report unless specified.     Treatment     Akua received the treatments listed below:      therapeutic exercises to develop strength, endurance, ROM, flexibility, posture, and core stabilization for 25 minutes including:  PPT  Clamshell  Bent knee fall out GTB  Attempted bridges with GTB - increased pain  Prone hip extension with flexed knee  Sciatic nerve glide in sitting position (review)  Education (see below)    manual therapy techniques: Joint mobilizations, Manual traction, Myofacial release, Soft tissue Mobilization, and Friction Massage were applied  to the: sacrum and lumbar spine / hips for 15 minutes, including:  STM to glute medius, piriformis lumbar paraspinals  Post innominate rotational glides with pain at end of range  Sacral flexion grade 1-2    neuromuscular re-education activities to improve: Balance, Coordination, Kinesthetic, Sense, Proprioception, and Posture for 10 minutes. The following activities were included:  Gait analysis - decreased right hip extension  Standing hip flex / ext for gait progressions      Patient Education and Home Exercises       Home Exercises Provided and Patient Education Provided     Education provided:   - added home exercise program (see wrap up)    Written Home Exercises Provided: yes. Exercises were reviewed and Akua was able to demonstrate them prior to the end of the session.  Akua demonstrated good  understanding of the education provided. See EMR under Patient Instructions for exercises provided during therapy sessions    Assessment     She did very well well to session today focused on core stability and figuring out which strengthening exercises are not going to irritate her likely bone bruise. She responded well to functional strengthening and gait training for proper hip extension and stance phase    Akua Is progressing well towards her goals.   Pt prognosis is Good.     Pt will continue to benefit from skilled outpatient physical therapy to address the deficits listed in the problem list box on initial evaluation, provide pt/family education and to maximize pt's level of independence in the home and community environment.     Pt's spiritual, cultural and educational needs considered and pt agreeable to plan of care and goals.     Anticipated barriers to physical therapy: none    Goals: Short Term Goals: 4 weeks   Patient will show 50% reduction in left side lumbar spine tone  Patient will show 50% improvement in joint mobility of the SIJ  Patient will be able to sit > 60 minutes before onset of  pain  Patient will show 50% reduction in tenderness in the hip musculature     Long Term Goals: 8 weeks   Patient will be able to walk > 2 miles before onset of pain  Patient will be able to sit > 2 hours for work related duties without pain increasing  Patient will be able to climb and descend 2 flights of stairs without pain increasing  50% FOTO score improvement    Plan     Cont per SAROJ Dexter, PT

## 2024-02-20 ENCOUNTER — OFFICE VISIT (OUTPATIENT)
Dept: URGENT CARE | Facility: CLINIC | Age: 44
End: 2024-02-20
Payer: COMMERCIAL

## 2024-02-20 VITALS
BODY MASS INDEX: 20.28 KG/M2 | OXYGEN SATURATION: 98 % | TEMPERATURE: 98 F | DIASTOLIC BLOOD PRESSURE: 72 MMHG | SYSTOLIC BLOOD PRESSURE: 107 MMHG | HEART RATE: 92 BPM | HEIGHT: 63 IN | RESPIRATION RATE: 16 BRPM | WEIGHT: 114.44 LBS

## 2024-02-20 DIAGNOSIS — H92.09 OTALGIA, UNSPECIFIED LATERALITY: ICD-10-CM

## 2024-02-20 DIAGNOSIS — J02.9 SORE THROAT: Primary | ICD-10-CM

## 2024-02-20 DIAGNOSIS — J06.9 ACUTE URI: ICD-10-CM

## 2024-02-20 DIAGNOSIS — R05.9 COUGH, UNSPECIFIED TYPE: ICD-10-CM

## 2024-02-20 DIAGNOSIS — R09.81 SINUS CONGESTION: ICD-10-CM

## 2024-02-20 LAB
CTP QC/QA: YES
MOLECULAR STREP A: NEGATIVE
POC MOLECULAR INFLUENZA A AGN: NEGATIVE
POC MOLECULAR INFLUENZA B AGN: NEGATIVE
SARS-COV-2 AG RESP QL IA.RAPID: NEGATIVE

## 2024-02-20 PROCEDURE — 87502 INFLUENZA DNA AMP PROBE: CPT | Mod: QW,S$GLB,, | Performed by: FAMILY MEDICINE

## 2024-02-20 PROCEDURE — 99214 OFFICE O/P EST MOD 30 MIN: CPT | Mod: S$GLB,,, | Performed by: FAMILY MEDICINE

## 2024-02-20 PROCEDURE — 87651 STREP A DNA AMP PROBE: CPT | Mod: QW,S$GLB,, | Performed by: FAMILY MEDICINE

## 2024-02-20 PROCEDURE — 87811 SARS-COV-2 COVID19 W/OPTIC: CPT | Mod: QW,S$GLB,, | Performed by: FAMILY MEDICINE

## 2024-02-20 RX ORDER — FLUOCINOLONE ACETONIDE 0.11 MG/ML
OIL AURICULAR (OTIC)
COMMUNITY
Start: 2023-08-31

## 2024-02-20 RX ORDER — FLUTICASONE PROPIONATE 50 MCG
1 SPRAY, SUSPENSION (ML) NASAL
COMMUNITY
End: 2024-02-22

## 2024-02-20 RX ORDER — CICLOPIROX 1 G/100ML
SHAMPOO TOPICAL
COMMUNITY
Start: 2023-08-31

## 2024-02-20 NOTE — PROGRESS NOTES
"Subjective:      Patient ID: Akua Duncan is a 43 y.o. female.    Vitals:  height is 5' 3" (1.6 m) and weight is 51.9 kg (114 lb 6.7 oz). Her oral temperature is 98.3 °F (36.8 °C). Her blood pressure is 107/72 and her pulse is 92. Her respiration is 16 and oxygen saturation is 98%.     Chief Complaint: Sore Throat    Pt states she started having symptoms on Sunday night. Pt states she started getting irritation in the back of her throat. Pt states she is coughing, ear pain, sneezing, nasal congestion with a running nose, headache, and a sore throat. Pt states she was possibly exposed to strep.  Patient denies fever, chills, chest pain, SOB, abdominal pain, dysuria, nausea/vomiting, weakness.    Sore Throat   This is a new problem. The current episode started in the past 7 days. The problem has been gradually worsening. Neither side of throat is experiencing more pain than the other. There has been no fever. The pain is at a severity of 2/10. The pain is mild. Associated symptoms include congestion, coughing, ear pain and headaches. She has had exposure to strep. Treatments tried: clarintin, asprin, benadryl. The treatment provided mild relief.       HENT:  Positive for ear pain, congestion and sore throat.    Respiratory:  Positive for cough.    Neurological:  Positive for headaches.      Objective:     Physical Exam   Constitutional: She is oriented to person, place, and time. She appears well-developed. She is cooperative.  Non-toxic appearance. She does not appear ill. No distress.   HENT:   Head: Normocephalic and atraumatic.   Ears:   Right Ear: Hearing, tympanic membrane, external ear and ear canal normal. impacted cerumen  Left Ear: Hearing, tympanic membrane, external ear and ear canal normal. impacted cerumen  Nose: Congestion present. No mucosal edema, rhinorrhea or nasal deformity. No epistaxis. Right sinus exhibits no maxillary sinus tenderness and no frontal sinus tenderness. Left sinus exhibits no " maxillary sinus tenderness and no frontal sinus tenderness.   Mouth/Throat: Uvula is midline, oropharynx is clear and moist and mucous membranes are normal. No trismus in the jaw. Normal dentition. No uvula swelling. No oropharyngeal exudate or posterior oropharyngeal edema.      Comments: +ve pharyngeal erythema w/o tonsillar swelling or exudates.        Eyes: Conjunctivae and lids are normal. No scleral icterus.   Neck: Trachea normal and phonation normal. Neck supple. No edema present. No erythema present. No neck rigidity present.   Cardiovascular: Normal rate, regular rhythm, normal heart sounds and normal pulses.   No murmur heard.  Pulmonary/Chest: Effort normal and breath sounds normal. No stridor. No respiratory distress. She has no decreased breath sounds. She has no wheezes. She has no rhonchi. She has no rales.   Abdominal: Normal appearance. She exhibits no distension. There is no abdominal tenderness. There is no left CVA tenderness and no right CVA tenderness.   Musculoskeletal: Normal range of motion.         General: No deformity. Normal range of motion.      Cervical back: She exhibits no tenderness.   Lymphadenopathy:     She has no cervical adenopathy.   Neurological: She is alert, oriented to person, place, and time and at baseline. She exhibits normal muscle tone. Coordination normal.   Skin: Skin is warm, dry, intact, not diaphoretic and not pale.   Psychiatric: Her speech is normal and behavior is normal. Judgment and thought content normal.   Nursing note and vitals reviewed.      Assessment:     1. Sore throat    2. Otalgia, unspecified laterality    3. Cough, unspecified type    4. Sinus congestion    5. Acute URI        Plan:   Discussed exam findings/results/diagnosis/plan with patient in clinic.  Throat precautions advised.  Advised to f/u with PCP within 2-5 days. ER precautions given if symptoms get any worse. All questions answered. Patient verbally understood and agreed with  treatment plan.     Sore throat  -     SARS Coronavirus 2 Antigen, POCT Manual Read  -     POCT Influenza A/B MOLECULAR  -     POCT Strep A, Molecular    Otalgia, unspecified laterality    Cough, unspecified type    Sinus congestion    Acute URI

## 2024-02-22 ENCOUNTER — OFFICE VISIT (OUTPATIENT)
Dept: URGENT CARE | Facility: CLINIC | Age: 44
End: 2024-02-22
Payer: COMMERCIAL

## 2024-02-22 VITALS
RESPIRATION RATE: 17 BRPM | TEMPERATURE: 99 F | HEART RATE: 73 BPM | BODY MASS INDEX: 21.26 KG/M2 | DIASTOLIC BLOOD PRESSURE: 73 MMHG | SYSTOLIC BLOOD PRESSURE: 109 MMHG | WEIGHT: 120 LBS | OXYGEN SATURATION: 97 % | HEIGHT: 63 IN

## 2024-02-22 DIAGNOSIS — R05.9 COUGH, UNSPECIFIED TYPE: ICD-10-CM

## 2024-02-22 DIAGNOSIS — Z20.818 EXPOSURE TO STREPTOCOCCAL PHARYNGITIS: ICD-10-CM

## 2024-02-22 DIAGNOSIS — Z20.822 EXPOSURE TO COVID-19 VIRUS: ICD-10-CM

## 2024-02-22 DIAGNOSIS — R06.7 SNEEZING: ICD-10-CM

## 2024-02-22 DIAGNOSIS — R09.81 NASAL CONGESTION: ICD-10-CM

## 2024-02-22 DIAGNOSIS — J02.9 SORE THROAT: Primary | ICD-10-CM

## 2024-02-22 DIAGNOSIS — U07.1 COVID-19 VIRUS INFECTION: ICD-10-CM

## 2024-02-22 DIAGNOSIS — J34.89 SINUS PRESSURE: ICD-10-CM

## 2024-02-22 PROBLEM — N84.0 ENDOMETRIAL POLYP: Status: ACTIVE | Noted: 2022-08-29

## 2024-02-22 PROBLEM — D21.9 FIBROIDS: Status: ACTIVE | Noted: 2020-11-13

## 2024-02-22 LAB
CTP QC/QA: YES
CTP QC/QA: YES
MOLECULAR STREP A: NEGATIVE
SARS-COV-2 AG RESP QL IA.RAPID: POSITIVE

## 2024-02-22 PROCEDURE — 99213 OFFICE O/P EST LOW 20 MIN: CPT | Mod: S$GLB,,, | Performed by: NURSE PRACTITIONER

## 2024-02-22 PROCEDURE — 87651 STREP A DNA AMP PROBE: CPT | Mod: QW,S$GLB,, | Performed by: NURSE PRACTITIONER

## 2024-02-22 PROCEDURE — 87811 SARS-COV-2 COVID19 W/OPTIC: CPT | Mod: QW,S$GLB,, | Performed by: NURSE PRACTITIONER

## 2024-02-22 RX ORDER — FLUTICASONE PROPIONATE 50 MCG
2 SPRAY, SUSPENSION (ML) NASAL DAILY PRN
Qty: 15.8 ML | Refills: 0 | Status: SHIPPED | OUTPATIENT
Start: 2024-02-22

## 2024-02-22 RX ORDER — BENZONATATE 100 MG/1
100 CAPSULE ORAL 3 TIMES DAILY PRN
Qty: 30 CAPSULE | Refills: 0 | Status: SHIPPED | OUTPATIENT
Start: 2024-02-22 | End: 2024-03-03

## 2024-02-22 RX ORDER — LORATADINE 10 MG/1
10 TABLET ORAL DAILY PRN
Qty: 30 TABLET | Refills: 0 | Status: SHIPPED | OUTPATIENT
Start: 2024-02-22

## 2024-02-22 NOTE — PATIENT INSTRUCTIONS
POSITIVE COVID TEST           You have tested positive for COVID-19 today.  Please note that patients who test positive for COVID-19 are required by the CDC to undergo isolation for 5 days after their symptoms first began.           This isolation starts from the day you first developed symptoms, not the day of your positive test. For example, if your symptoms began on a Monday but tested positive on the following Wednesday, your 5-day isolation begins from that Monday, not the Wednesday you tested positive.           However, if you are asymptomatic (a person who does not have any symptoms) and COVID-19 positive, your 5-day isolation begins on the day you tested positive, regardless of exposure date.           Also, per the CDC guidelines, once your 5 days have passed, and you have not had fever greater than 100.4F in the last 24 hours without taking any fever reducers such as Tylenol (Acetaminophen) or Motrin (Ibuprofen), you may return to your normal activities including social distancing, wearing masks (continually for 5 more days), and frequent handwashing - YOU DO NOT NEED ANOTHER TEST IN ORDER TO END YOUR QUARANTINE.     Please drink plenty of fluids.  Please get plenty of rest.  Please return here or go to the Emergency Department for any concerns or worsening of condition.  It is ok to take over the counter plain Allegra or Claritin or Zyrtec.   If you do have Hypertension or palpitations, it is safe to take Coricidin HBP for relief of sinus symptoms.  We recommend you take Flonase (Fluticasone) or another nasally inhaled steroid unless you are already taking one.  Nasal irrigation with a saline spray or Netti Pot like device per their directions is also recommended.  If not allergic, please take over the counter Tylenol (Acetaminophen) and/or Motrin (Ibuprofen) as directed for control of pain and/or fever.  Please follow up with your primary care doctor or specialist as needed.    If you  smoke, please  stop smoking.

## 2024-02-22 NOTE — LETTER
[id number] [facility] [address]  Phone: [phone]  Fax: [phone]          Return to Work/School    Patient: Akua Duncan  YOB: 1980   Date: 02/22/2024     To Whom It May Concern:     Akua Duncan was in contact with/seen in my office on 02/22/2024. COVID-19 is present in our communities across the [facility].      Akua Duncan has met the criteria for COVID-19 testing and has a POSITIVE result. She can return to work once they are asymptomatic for 24 hours without the use of fever reducing medications AND at least five days from the start of symptoms (or from the first positive result if they have no symptoms).      If you have any questions or concerns, or if I can be of further assistance, please do not hesitate to contact me.     Sincerely,     Virgen Whitfield, NP

## 2024-02-22 NOTE — PROGRESS NOTES
"Subjective:      Patient ID: Akua Duncan is a 43 y.o. female.    Vitals:  height is 5' 3" (1.6 m) and weight is 54.4 kg (120 lb). Her oral temperature is 98.8 °F (37.1 °C). Her blood pressure is 109/73 and her pulse is 73. Her respiration is 17 and oxygen saturation is 97%.     Chief Complaint: Sinus Problem and Cough    PT IS HERE WITH C/O COUGH, CONGESTION, SNEEZING & SINUS PRESSURE, PT ALSO STATES HER THROAT IS STILL IS SORE. CAME TUESDAY FOR SIMILAR SYMPTOMS.    43-year-old female presents to clinic with complaints of cough, nasal congestion, sneezing, sinus pressure, sore throat, exposure to strep pharyngitis and covid 19 virus. Requesting repeat resting for covid and strep; home covid test today with positive result. She presented to urgent care 2 days ago tested negative for strep, covid, flu A & B.  Received Pfizer covid vaccine x 5 doses, Moderna covid vaccine x 1 dose, influenza vaccine 12/14/23    Sinus Problem  This is a new problem. Episode onset: 4 DAYS. The problem has been gradually worsening since onset. Associated symptoms include congestion, coughing, headaches, a hoarse voice, sinus pressure, sneezing and a sore throat. Pertinent negatives include no shortness of breath. Treatments tried: TYLENOL, DAYQUIL, FLONASE. The treatment provided mild relief.       HENT:  Positive for congestion, sinus pressure and sore throat.    Respiratory:  Positive for cough. Negative for shortness of breath.    Allergic/Immunologic: Positive for sneezing.   Neurological:  Positive for headaches.      Objective:     Physical Exam   Constitutional: She is oriented to person, place, and time. She appears well-developed. She is cooperative.  Non-toxic appearance. She does not appear ill. No distress.   HENT:   Head: Normocephalic and atraumatic.   Ears:   Right Ear: Hearing, tympanic membrane, external ear and ear canal normal.   Left Ear: Hearing, tympanic membrane, external ear and ear canal normal.   Nose: Mucosal " edema present. No rhinorrhea or nasal deformity. No epistaxis. Right sinus exhibits no maxillary sinus tenderness and no frontal sinus tenderness. Left sinus exhibits no maxillary sinus tenderness and no frontal sinus tenderness.   Mouth/Throat: Uvula is midline, oropharynx is clear and moist and mucous membranes are normal. No trismus in the jaw. Normal dentition. No uvula swelling. No oropharyngeal exudate, posterior oropharyngeal edema or posterior oropharyngeal erythema.   Eyes: Conjunctivae and lids are normal. No scleral icterus.   Neck: Trachea normal and phonation normal. Neck supple. No edema present. No erythema present. No neck rigidity present.   Cardiovascular: Normal rate, regular rhythm and normal heart sounds.   Pulmonary/Chest: Effort normal and breath sounds normal. No respiratory distress. She has no decreased breath sounds. She has no rhonchi.   Abdominal: Normal appearance.   Musculoskeletal: Normal range of motion.         General: No deformity. Normal range of motion.   Neurological: She is alert and oriented to person, place, and time. She exhibits normal muscle tone. Coordination normal.   Skin: Skin is warm, dry, intact, not diaphoretic and not pale.   Psychiatric: Her speech is normal and behavior is normal. Judgment and thought content normal.   Nursing note and vitals reviewed.      Assessment:     1. Sore throat    2. COVID-19 virus infection    3. Cough, unspecified type    4. Nasal congestion    5. Sneezing    6. Sinus pressure    7. Exposure to COVID-19 virus    8. Exposure to Streptococcal pharyngitis      Results for orders placed or performed in visit on 02/22/24   SARS Coronavirus 2 Antigen, POCT Manual Read   Result Value Ref Range    SARS Coronavirus 2 Antigen Positive (A) Negative     Acceptable Yes    POCT Strep A, Molecular   Result Value Ref Range    Molecular Strep A, POC Negative Negative     Acceptable Yes       Plan:       Sore throat  -      POCT Strep A, Molecular    COVID-19 virus infection    Cough, unspecified type  -     SARS Coronavirus 2 Antigen, POCT Manual Read  -     benzonatate (TESSALON) 100 MG capsule; Take 1 capsule (100 mg total) by mouth 3 (three) times daily as needed for Cough.  Dispense: 30 capsule; Refill: 0    Nasal congestion  -     fluticasone propionate (FLONASE) 50 mcg/actuation nasal spray; 2 sprays (100 mcg total) by Each Nostril route daily as needed for Rhinitis.  Dispense: 15.8 mL; Refill: 0    Sneezing  -     loratadine (CLARITIN) 10 mg tablet; Take 1 tablet (10 mg total) by mouth daily as needed for Allergies.  Dispense: 30 tablet; Refill: 0    Sinus pressure    Exposure to COVID-19 virus  -     SARS Coronavirus 2 Antigen, POCT Manual Read    Exposure to Streptococcal pharyngitis  -     POCT Strep A, Molecular      0 covid risk score            Patient Instructions   POSITIVE COVID TEST           You have tested positive for COVID-19 today.  Please note that patients who test positive for COVID-19 are required by the CDC to undergo isolation for 5 days after their symptoms first began.           This isolation starts from the day you first developed symptoms, not the day of your positive test. For example, if your symptoms began on a Monday but tested positive on the following Wednesday, your 5-day isolation begins from that Monday, not the Wednesday you tested positive.           However, if you are asymptomatic (a person who does not have any symptoms) and COVID-19 positive, your 5-day isolation begins on the day you tested positive, regardless of exposure date.           Also, per the CDC guidelines, once your 5 days have passed, and you have not had fever greater than 100.4F in the last 24 hours without taking any fever reducers such as Tylenol (Acetaminophen) or Motrin (Ibuprofen), you may return to your normal activities including social distancing, wearing masks (continually for 5 more days), and frequent  handwashing - YOU DO NOT NEED ANOTHER TEST IN ORDER TO END YOUR QUARANTINE.     Please drink plenty of fluids.  Please get plenty of rest.  Please return here or go to the Emergency Department for any concerns or worsening of condition.  It is ok to take over the counter plain Allegra or Claritin or Zyrtec.   If you do have Hypertension or palpitations, it is safe to take Coricidin HBP for relief of sinus symptoms.  We recommend you take Flonase (Fluticasone) or another nasally inhaled steroid unless you are already taking one.  Nasal irrigation with a saline spray or Netti Pot like device per their directions is also recommended.  If not allergic, please take over the counter Tylenol (Acetaminophen) and/or Motrin (Ibuprofen) as directed for control of pain and/or fever.  Please follow up with your primary care doctor or specialist as needed.    If you  smoke, please stop smoking.

## 2024-03-19 ENCOUNTER — TELEPHONE (OUTPATIENT)
Dept: SPINE | Facility: CLINIC | Age: 44
End: 2024-03-19

## 2024-03-19 NOTE — TELEPHONE ENCOUNTER
Staff contacted patient to confirm appointment on 03/20/24. There was no answer, left voicemail.

## 2024-07-31 ENCOUNTER — NURSE TRIAGE (OUTPATIENT)
Dept: ADMINISTRATIVE | Facility: CLINIC | Age: 44
End: 2024-07-31

## 2024-07-31 NOTE — TELEPHONE ENCOUNTER
Pt reports 1 inch foot laceration to back of right heel. Pt reports last night she hit heel on edge of cabinet. Today at work site bled through bandages. Pt denies active bleeding currently but reports there could be some slow bleeding. Pt reports 5/10 pain currently. Care advice to go to ED now. Pt verbalizes understanding.   Reason for Disposition   Skin is split open or gaping (or length > 1/2 inch or 12 mm on the skin, 1/4 inch or 6 mm on the face)    Additional Information   Negative: Major bleeding (e.g., actively dripping or spurting) and can't be stopped   Negative: Amputation   Negative: Shock suspected (e.g., cold/pale/clammy skin, too weak to stand, low BP, rapid pulse)   Negative: Knife wound (or other possibly deep cut) and to chest, abdomen, back, neck, or head   Negative: Self-injury (e.g., cutting, self-harm) and suicidal or out-of-control   Negative: Sounds like a life-threatening emergency to the triager   Negative: Bleeding and won't stop after 10 minutes of direct pressure (using correct technique)    Protocols used: Cuts and [id number]